# Patient Record
Sex: MALE | Race: WHITE | NOT HISPANIC OR LATINO | Employment: OTHER | URBAN - METROPOLITAN AREA
[De-identification: names, ages, dates, MRNs, and addresses within clinical notes are randomized per-mention and may not be internally consistent; named-entity substitution may affect disease eponyms.]

---

## 2020-11-05 ENCOUNTER — HOSPITAL ENCOUNTER (OUTPATIENT)
Dept: LAB | Facility: MEDICAL CENTER | Age: 65
End: 2020-11-05
Attending: FAMILY MEDICINE
Payer: MEDICARE

## 2020-11-05 PROCEDURE — U0003 INFECTIOUS AGENT DETECTION BY NUCLEIC ACID (DNA OR RNA); SEVERE ACUTE RESPIRATORY SYNDROME CORONAVIRUS 2 (SARS-COV-2) (CORONAVIRUS DISEASE [COVID-19]), AMPLIFIED PROBE TECHNIQUE, MAKING USE OF HIGH THROUGHPUT TECHNOLOGIES AS DESCRIBED BY CMS-2020-01-R: HCPCS

## 2020-11-05 PROCEDURE — C9803 HOPD COVID-19 SPEC COLLECT: HCPCS

## 2020-11-06 LAB
COVID ORDER STATUS COVID19: NORMAL
SARS-COV-2 RNA RESP QL NAA+PROBE: NOTDETECTED
SPECIMEN SOURCE: NORMAL

## 2021-03-03 DIAGNOSIS — Z23 NEED FOR VACCINATION: ICD-10-CM

## 2021-06-14 ENCOUNTER — APPOINTMENT (OUTPATIENT)
Dept: SLEEP MEDICINE | Facility: MEDICAL CENTER | Age: 66
End: 2021-06-14
Payer: MEDICARE

## 2021-06-15 ENCOUNTER — SLEEP CENTER VISIT (OUTPATIENT)
Dept: SLEEP MEDICINE | Facility: MEDICAL CENTER | Age: 66
End: 2021-06-15
Payer: MEDICARE

## 2021-06-15 VITALS
OXYGEN SATURATION: 94 % | WEIGHT: 266 LBS | SYSTOLIC BLOOD PRESSURE: 128 MMHG | BODY MASS INDEX: 38.08 KG/M2 | HEART RATE: 85 BPM | DIASTOLIC BLOOD PRESSURE: 82 MMHG | RESPIRATION RATE: 16 BRPM | HEIGHT: 70 IN

## 2021-06-15 DIAGNOSIS — G47.33 OSA (OBSTRUCTIVE SLEEP APNEA): ICD-10-CM

## 2021-06-15 DIAGNOSIS — G47.10 HYPERSOMNIA: ICD-10-CM

## 2021-06-15 PROCEDURE — 99203 OFFICE O/P NEW LOW 30 MIN: CPT | Performed by: FAMILY MEDICINE

## 2021-06-15 RX ORDER — FUROSEMIDE 20 MG/1
20 TABLET ORAL
COMMUNITY
Start: 2021-05-22 | End: 2022-07-12

## 2021-06-15 RX ORDER — ATORVASTATIN CALCIUM 10 MG/1
10 TABLET, FILM COATED ORAL DAILY
COMMUNITY

## 2021-06-15 RX ORDER — AZATHIOPRINE 50 MG/1
TABLET ORAL
COMMUNITY
Start: 2021-04-19 | End: 2023-05-24

## 2021-06-15 RX ORDER — SOLRIAMFETOL 75 MG/1
75 TABLET, FILM COATED ORAL EVERY MORNING
Qty: 14 TABLET | Refills: 0 | COMMUNITY
Start: 2021-06-15 | End: 2021-06-15

## 2021-06-15 RX ORDER — SOLRIAMFETOL 75 MG/1
75 TABLET, FILM COATED ORAL EVERY MORNING
Qty: 14 TABLET | Refills: 0 | COMMUNITY
Start: 2021-06-15 | End: 2021-06-29

## 2021-06-15 RX ORDER — PREDNISONE 5 MG/1
TABLET ORAL
COMMUNITY
Start: 2021-01-26 | End: 2022-07-12

## 2021-06-15 RX ORDER — TAMSULOSIN HYDROCHLORIDE 0.4 MG/1
0.4 CAPSULE ORAL
Status: ON HOLD | COMMUNITY
End: 2023-07-10

## 2021-06-15 RX ORDER — SULFASALAZINE 500 MG/1
TABLET ORAL
COMMUNITY
Start: 2021-05-16 | End: 2023-05-24

## 2021-06-15 NOTE — PATIENT INSTRUCTIONS
Start off at 37.5 mg (1/2 tab ) x 4 days . If no improvement, increase to 75 mg daily as needed.    Solriamfetol tablets  What is this medicine?  SOLRIAMFETOL (sol ri AM fe don) is used to treat excessive sleepiness caused by certain sleep disorders including narcolepsy and obstructive sleep apnea.  This medicine may be used for other purposes; ask your health care provider or pharmacist if you have questions.  COMMON BRAND NAME(S): NICOLA  What should I tell my health care provider before I take this medicine?  They need to know if you have any of these conditions:  · bipolar disorder  · diabetes  · heart disease  · high blood pressure  · high cholesterol  · history of drug abuse or alcohol abuse problem  · history of stroke  · kidney disease  · schizophrenia  · an unusual or allergic reaction to solriamfetol, other medicines, foods, dyes, or preservatives  · pregnant or trying to get pregnant  · breast-feeding  How should I use this medicine?  Take this medicine by mouth with a glass of water when you first wake up. Do not take it within 9 hours of your planned bedtime. Follow the directions on the prescription label. You can take it with or without food. If it upsets your stomach, take it with food. Take your medicine at regular intervals. Do not take it more often than directed. Do not stop taking except on your doctor's advice.  A special MedGuide will be given to you by the pharmacist with each prescription and refill. Be sure to read this information carefully each time.  Talk to your pediatrician regarding the use of this medicine in children. Special care may be needed.  Overdosage: If you think you have taken too much of this medicine contact a poison control center or emergency room at once.  NOTE: This medicine is only for you. Do not share this medicine with others.  What if I miss a dose?  If you miss a dose, take it as soon as you can. However, avoid taking it within 9 hours of your planned  bedtime, since you may find it harder to go to sleep. If it is almost time for your next dose, take only that dose. Do not take double or extra doses.  What may interact with this medicine?  Do not take this medicine with any of the following medications:  · MAOIs like Carbex, Eldepryl, Marplan, Nardil, and Parnate  This medicine may also interact with the following medications:  · certain medicines for Parkinson's disease like levodopa, pramipexole, or ropinirole  · medicines that increase blood pressure or heart rate  This list may not describe all possible interactions. Give your health care provider a list of all the medicines, herbs, non-prescription drugs, or dietary supplements you use. Also tell them if you smoke, drink alcohol, or use illegal drugs. Some items may interact with your medicine.  What should I watch for while using this medicine?  Visit your healthcare professional for regular checks on your progress. Tell your healthcare professional if your symptoms do not start to get better or if they get worse.  This medicine has a risk of abuse and dependence. Your healthcare provider will check you for this while you take this medicine.  What side effects may I notice from receiving this medicine?  Side effects that you should report to your doctor or health care professional as soon as possible:  · allergic reactions like skin rash, itching, and hives; swelling of the face, lips, or tongue  · anxiety  · changes in emotions or moods  · elevated mood, decreased need for sleep, racing thoughts, impulsive behavior  · fast heartbeat  · hallucinations, loss of contact with reality  · irritable  · signs and symptoms of a dangerous increase in blood pressure like chest pain; shortness of breath; sudden severe headache; vision disturbances; seizures; decreased consciousness  · signs and symptoms of a stroke like changes in vision; confusion; trouble speaking or understanding; severe headaches; sudden numbness  or weakness of the face, arm or leg; trouble walking; dizziness; loss of balance or coordination  · vomiting  Side effects that usually do not require medical attention (report these to your doctor or health care professional if they continue or are bothersome):  · decreased appetite  · dry mouth  · increased sweating  · nausea  · trouble sleeping  This list may not describe all possible side effects. Call your doctor for medical advice about side effects. You may report side effects to FDA at 8-483-UZE-0494.  Where should I keep my medicine?  Keep out of the reach of children. This medicine can be abused. Keep your medicine in a safe place to protect it from theft. Do not share this medicine with anyone. Selling or giving away this medicine is dangerous and is against the law.  Store at room temperature between 15 and 30 degrees C (59 and 86 degrees F).  This medicine may cause harm and death if it is taken by other adults, children, or pets. Return medicine that has not been used to an official disposal site. Contact the [facility] at [phone] or your [facility] government to find a site. If you cannot return the medicine, mix any unused medicine with a substance like cat litter or coffee grounds. Then throw the medicine away in a sealed container like a sealed bag or coffee can with a lid. Do not use the medicine after the expiration date.  NOTE: This sheet is a summary. It may not cover all possible information. If you have questions about this medicine, talk to your doctor, pharmacist, or health care provider.  © 2020 [employer] (2019-10-03 12:40:49)

## 2021-06-15 NOTE — PROGRESS NOTES
"  [facility]  Consult Note     Date: 6/15/2021 / Time: 11:18 AM    Patient ID:   Name:             Gonzalo Mao     YOB: 1955  Age:                 65 y.o.  male   MRN:               [id number]      Thank you for requesting a sleep medicine consultation on Gonzalo Mao at the sleep center. He presents today with the chief complaints of GLENN and to establish as a new pt.  Pt was diagnosed with GLENN about 10 + years ago and has been on ASV EPAP min/max 4/15 cm PS 3/15 cm.The initial presenting symptoms were snoring and excessive daytime sleepiness.    HISTORY OF PRESENT ILLNESS:       At night,  Gonzalo Mao goes to bed around 10 pm on weekdays and on the weekends. He gets out of bed at 7 am on weekdays and  on the weekends.  He  Averages about 9 hrs of sleep on a good night and 7 hrs on a bad night. He falls asleep within 10 minutes. He wakes up about 3 times during the night due to bathroom use and on average It takes him few min to fall back asleep. He is aware of snoring/breathing pauses/gasping or choking in sleep.  He  denies any symptoms of restless legs syndrome such as an \"urge to move\"  He  legs in the evening or bedtime. He  denies any symptoms of narcolepsy such as sleep paralysis or cataplexy, or any symptoms to suggest parasomnias such as sleep walking or acting out of dreams. He  has not used any medications for the sleep problem.Overall, he doesnot finds his sleep refreshing. In terms of  excessive daytime sleepiness,  He complains of sleepiness  while reading or watching TV and while driving. He has dozed off on the traffic light. [place] sleepiness scale score is 24/24.He does not take regular naps. He drinks about 0 caffeinated beverages per day.He has not been using the ASV because the pressure is too much which has been causing mask leak.  The 30-day compliance showed minimal usage with AHI of 17.7/h and increased mask leak.    REVIEW OF SYSTEMS:     "   Constitutional: Denies fevers, Denies weight changes  Eyes: Denies changes in vision, no eye pain  Ears/Nose/Throat/Mouth: Denies nasal congestion or sore throat   Cardiovascular: Denies chest pain or palpitations   Respiratory: Denies shortness of breath , Denies cough  Gastrointestinal/Hepatic: Denies abdominal pain, nausea, vomiting, diarrhea, constipation or GI bleeding   Genitourinary: Denies bladder dysfunction, dysuria or frequency  Musculoskeletal/Rheum: Denies  joint pain and swelling   Skin/Breast: Denies rash  Neurological: Denies headache, confusion, memory loss or focal weakness/parasthesias  Psychiatric: denies mood disorder     Comprehensive review of systems form is reviewed with the patient and is attached in the EMR.     PMH:  has no past medical history on file.  MEDS:   Current Outpatient Medications:   •  atorvastatin (LIPITOR) 10 MG Tab, atorvastatin 10 mg tablet, Disp: , Rfl:   •  azaTHIOprine (IMURAN) 50 MG Tab, TAKE 3 TABLETS BY MOUTH EVERY DAY AS DIRECTED, Disp: , Rfl:   •  furosemide (LASIX) 20 MG Tab, Take 20 mg by mouth every day., Disp: , Rfl:   •  predniSONE (DELTASONE) 5 MG Tab, prednisone 5 mg tablet  TAKE 2 TABLETS BY MOUTH EVERY DAY, Disp: , Rfl:   •  sulfaSALAzine (AZULFIDINE) 500 MG Tab, TAKE 2 TABLETS BY MOUTH TWICE DAILY AS DIRECTED, Disp: , Rfl:   •  tamsulosin (FLOMAX) 0.4 MG capsule, tamsulosin 0.4 mg capsule  Take 1 capsule every day by oral route at bedtime., Disp: , Rfl:   ALLERGIES: No Known Allergies  SURGHX: History reviewed. No pertinent surgical history.  SOCHX:  reports that he has never smoked. He has never used smokeless tobacco. He reports previous alcohol use. He reports previous drug use..    FH:   Family History   Problem Relation Age of Onset   • Sleep Apnea Neg Hx        Physical Exam:  Vitals/ General Appearance:   Weight/BMI: Body mass index is 38.17 kg/m².  /82 (BP Location: Right arm, Patient Position: Sitting, BP Cuff Size: Adult)   Pulse 85    "Resp 16   Ht 1.778 m (5' 10\")   Wt 121 kg (266 lb)   SpO2 94%   Vitals:    06/15/21 1105   BP: 128/82   BP Location: Right arm   Patient Position: Sitting   BP Cuff Size: Adult   Pulse: 85   Resp: 16   SpO2: 94%   Weight: 121 kg (266 lb)   Height: 1.778 m (5' 10\")           Constitutional: Alert, no distress, well-groomed.  Skin: No rashes in visible areas.  Eye: Round. Conjunctiva clear, lids normal. No icterus.   ENMT: Lips pink without lesions, good dentition, moist mucous membranes. Phonation normal.  Neck: No masses, no thyromegaly. Moves freely without pain.  CV: Pulse as reported by patient  Respiratory: Unlabored respiratory effort, no cough or audible wheeze  Psych: Alert and oriented x3, normal affect and mood.   INVESTIGATIONS:        ASSESSMENT AND PLAN     1. Sleep Apnea .     The pathophysiology of sleep anea and the increased risk of cardiovascular morbidity from untreated sleep apnea is discussed in detail with the patient.    He is urged to avoid supine sleep, weight gain and alcoholic beverages since all of these can worsen sleep apnea. He is cautioned against drowsy driving. If He feels sleepy while driving, He must pull over for a break/nap, rather than persist on the road, in the interest of He own safety and that of others on the road.   Plan   -ASV pressure was changed to EPAP min/max 4/10 cm and PS 3/10 cm.  Recommended to continue using AirTouch F 20   -due to residual elevated AHI split-night study is ordered to reestablish the diagnosis as well as to titrate on ASV   - compliance download was reviewed and discussed with the pt   - compliance was reinforced     2.  Hypersomnia: He denies any symptoms of narcolepsy I.e. hypnagogic hallucination, sleep paralysis or cataplexy.  Most likely the cause of his excessive daytime sleepiness is due to untreated sleep apnea.  Due to his sleepiness during driving and his upcoming trip to [place] we will try Sunosi for short period of time while he " is away and re starting the PAP therapy.  Sunosi is just for the interim period.  Low suspicion of narcolepsy therefore we will hold off on doing an MSLT. recommended to start Sunosi at 37.5 mg x 4 days however if no improvement he can increase it to 75 mg.  Side effects were discussed in detail     Regarding treatment of other past medical problems and general health maintenance,  He is urged to follow up with PCP.

## 2021-07-18 ENCOUNTER — SLEEP STUDY (OUTPATIENT)
Dept: SLEEP MEDICINE | Facility: MEDICAL CENTER | Age: 66
End: 2021-07-18
Attending: FAMILY MEDICINE
Payer: MEDICARE

## 2021-07-18 DIAGNOSIS — G47.10 HYPERSOMNIA: ICD-10-CM

## 2021-07-18 DIAGNOSIS — G47.33 OSA (OBSTRUCTIVE SLEEP APNEA): ICD-10-CM

## 2021-07-21 NOTE — PROCEDURES
MONTAGE: Standard     STUDY TYPE: Split night study    RECORDING TECHNIQUE:  After the scalp was prepared, gold plated electrodes were applied to the scalp according to the International 10-20 System. EEG (electroencephalogram) was continuously monitored from the O1-M2, O2-M1, C3-M2, C4-M1, F3-M2, and F4-M1. EOGs (electrooculograms) were monitored by electrodes placed at the left and right outer canthi.  Chin EMG (electromyogram) was monitored by electrodes placed on the mentalis and sub-mentalis muscles.  Nasal and oral airflow were monitored using a triple port thermocouple as well as oronasal pressure transducer.  Respiratory effort was measured by inductive plethysmography technology employing abdominal and thoracic belts.  Blood oxygen saturation and pulse were monitored by pulse oximetry.  Heart rhythm was monitored by surface electrocardiogram.  Leg EMG was studied using surface electrodes placed on left and right anterior tibialis.  A microphone was used to monitor tracheal sounds and snoring.  Body position was monitored and documented by technician observation.     SCORING CRITERIA:     A modification of the AASM manual for scoring of sleep and associated events was used.  Obstructive apneas were scored by cessation of airflow for at least 10 seconds with continuing respiratory effort.  Central apneas  were  scored by cessation of airflow for at least 10 seconds with no respiratory effort.  Hypopneas were scored by a 30% or more reduction in airflow for at least 10 seconds accompanied by arterial oxygen desaturation of 3%  or an arousal.  CMS, for Medicare patients , hypopneas are scored by 30% with mild reduction in airflow for at least 10 seconds accompanied by arterial saturation decreased at 4%.       Interpretation:  Study start time was 10:46:01 PM. Total recording time was 3h 30.0m (210 minutes) with a total sleep time of 2h 18.0m (138 minutes) resulting in a sleep efficiency of 65.71%.  Sleep  latency from the start fo the study was 11 minutes minutes and REM latency from sleep onset was 00 minutes minutes.    Respiratory:   There were 222 apneas in total consisting of 196 obstructive apneas, 0 mixed apneas, and 26 central apneas. There were 3 hypopneas in total.  The apnea index was 96.52 per hour and the hypopnea index was 1.30 per hour.The overall AHI was 97.8, with a REM AHI of 0.00, and a supine AHI of 122.50.  11 % of the events were central in nature.    Limb Movements:  There were a total of 43 periodic leg movements, of which 13 were PLMS arousals. This resulted in a PLMS index of 18.7 and a PLMS arousal index of 5.7    Oximetry:  The mean SaO2 was 88.0% for the diagnostic portion of the study, with a minimum SaO2 of 67.0%.   Treatment:  Interpretation:  Treatment recording time was 4h 16.5m (256 minutes) with a total sleep time of 3h 22.0m (202 minutes) resulting in a sleep efficiency of 78.8%. Sleep latency from the start of treatment was 01 minutes minutes and REM latency from sleep onset was 0h 43.0m minutes. The patient had 227 arousals in total for an arousal index of 67.4.    Respiratory:   There were 174 apneas in total consisting of 107 obstructive apneas, 67 central apneas, and 0 mixed apneas for an apnea index of 51.68.   The patient had 67 hypopneas in total, which resulted in a hypopnea index of 19.90. The overall AHI was 71.58, with a REM AHI of 42.58, and a supine AHI of 71.58.   27% of the events were central in nature.    Limb Movements:  There were a total of 115 periodic leg movements, of which 60 were PLMS arousals. This resulted in a PLMS index of 34.2 and a PLMS arousal index of 17.8.    Oximetry:  The mean SaO2 during treatment was 89.0%, with a minimum oxygen saturation of 68.0%.    CPAP was tried from 5 to 14 cm H2O. BiPAP was tried from 13/9 to 22/18cm H2O.ASV was tried from EPAP 6 PS: 10/3 to  EPAP: 10/ 4, PS: 10/3cm H2O        CPAP Titration:  Due to the significant  number of obstructive respiratory events observed during the diagnostic portion of the study a CPAP titration trial was performed during the second half of the night. The CPAP pressure was initiated at 5 cm of water and the pressure was increased in an attempt to eliminate all sleep disordered breathing and snoring. CPAP was increased to 14  Cm before switching to BiPAP. The BiPAP was titrated between 13/9 cm to 22/18 cm. At the end of the night, ResMed ASV therapy was tried because patient is currently utilizing ASVAuto @ EPAP 4/10, PS 3/10 at home. ASV titration was started at EPAP 6, PS 3/10 and then tried the ASVAuto at EPAP 4/10, PS 3/10.  It was a difficult titration due to mask leak. Chin strap was tried however patient was not keen on using it. At this final pressure the patient was observed in the supine but not REM sleep stage. The apnea hypopnea index was 67.32/hr with improved O2 liz of  80%.He spent 36 % of sleep time below 88% O2 saturation. Snoring persisted through out the study.  The patient utilized large AirTouch F20  mask with heated humidification.     Impression:  1.  Severe obstructive sleep apnea with AHI of 97.8/hr and O2 liz 67 %. Due to severity of the disease he met the split study protocol. The titration started with CPAP 5 cm and the ending pressure was ResMed ASVAuto at EPAP 4/10, PS 3/10 cm. The AHI improved to 67.32/hr with improved O2 liz of 80% and average O2 saturation of 90 %.     2.  Sleep related hypoxia    Recommendations:  No definitive pressure can be extrapolated from the titration, I recommend dedicated ASV titration. In some cases alternative treatment options may be proven effective in resolving sleep apnea. These options include upper airway surgery, the use of a dental orthotic, weight loss orpositional therapy. Clinical correlation is required. In general patients with sleep apnea are advised to avoid alcohol, sedatives and not to operate a motor vehicle while  drowsy.  Untreated sleep apnea increases the risk for cardiovascular and neurovascular disease.

## 2021-07-25 PROCEDURE — 95811 POLYSOM 6/>YRS CPAP 4/> PARM: CPT | Performed by: FAMILY MEDICINE

## 2021-07-29 ENCOUNTER — SLEEP CENTER VISIT (OUTPATIENT)
Dept: SLEEP MEDICINE | Facility: MEDICAL CENTER | Age: 66
End: 2021-07-29
Payer: MEDICARE

## 2021-07-29 VITALS
SYSTOLIC BLOOD PRESSURE: 128 MMHG | DIASTOLIC BLOOD PRESSURE: 84 MMHG | OXYGEN SATURATION: 93 % | HEIGHT: 69 IN | RESPIRATION RATE: 16 BRPM | WEIGHT: 261 LBS | HEART RATE: 86 BPM | BODY MASS INDEX: 38.66 KG/M2

## 2021-07-29 DIAGNOSIS — G47.31 COMPLEX SLEEP APNEA SYNDROME: ICD-10-CM

## 2021-07-29 PROCEDURE — 99213 OFFICE O/P EST LOW 20 MIN: CPT | Performed by: FAMILY MEDICINE

## 2021-07-29 RX ORDER — ZOLPIDEM TARTRATE 5 MG/1
5 TABLET ORAL NIGHTLY PRN
Qty: 2 TABLET | Refills: 0 | Status: SHIPPED | OUTPATIENT
Start: 2021-07-29 | End: 2021-07-30

## 2021-07-29 NOTE — PATIENT INSTRUCTIONS
Zolpidem tablets  What is this medicine?  ZOLPIDEM (zole PI dem) is used to treat insomnia. This medicine helps you to fall asleep and sleep through the night.  This medicine may be used for other purposes; ask your health care provider or pharmacist if you have questions.  COMMON BRAND NAME(S): Lyle  What should I tell my health care provider before I take this medicine?  They need to know if you have any of these conditions:  · depression  · history of drug abuse or addiction  · if you often drink alcohol  · liver disease  · lung or breathing disease  · myasthenia gravis  · sleep apnea  · sleep-walking, driving, eating or other activity while not fully awake after taking a sleep medicine  · suicidal thoughts, plans, or attempt; a previous suicide attempt by you or a family member  · an unusual or allergic reaction to zolpidem, other medicines, foods, dyes, or preservatives  · pregnant or trying to get pregnant  · breast-feeding  How should I use this medicine?  Take this medicine by mouth with a glass of water. Follow the directions on the prescription label. It is better to take this medicine on an empty stomach and only when you are ready for bed. Do not take your medicine more often than directed. If you have been taking this medicine for several weeks and suddenly stop taking it, you may get unpleasant withdrawal symptoms. Your doctor or health care professional may want to gradually reduce the dose. Do not stop taking this medicine on your own. Always follow your doctor or health care professional's advice.  A special MedGuide will be given to you by the pharmacist with each prescription and refill. Be sure to read this information carefully each time.  Talk to your pediatrician regarding the use of this medicine in children. Special care may be needed.  Overdosage: If you think you have taken too much of this medicine contact a poison control center or emergency room at once.  NOTE: This medicine is only  "for you. Do not share this medicine with others.  What if I miss a dose?  This does not apply. This medicine should only be taken immediately before going to sleep. Do not take double or extra doses.  What may interact with this medicine?  · alcohol  · antihistamines for allergy, cough and cold  · certain medicines for anxiety or sleep  · certain medicines for depression, like amitriptyline, fluoxetine, sertraline  · certain medicines for fungal infections like ketoconazole and itraconazole  · certain medicines for seizures like phenobarbital, primidone  · ciprofloxacin  · dietary supplements for sleep, like valerian or kava kava  · general anesthetics like halothane, isoflurane, methoxyflurane, propofol  · local anesthetics like lidocaine, pramoxine, tetracaine  · medicines that relax muscles for surgery  · narcotic medicines for pain  · phenothiazines like chlorpromazine, mesoridazine, prochlorperazine, thioridazine  · rifampin  This list may not describe all possible interactions. Give your health care provider a list of all the medicines, herbs, non-prescription drugs, or dietary supplements you use. Also tell them if you smoke, drink alcohol, or use illegal drugs. Some items may interact with your medicine.  What should I watch for while using this medicine?  Visit your doctor or health care professional for regular checks on your progress. Keep a regular sleep schedule by going to bed at about the same time each night. Avoid caffeine-containing drinks in the evening hours. When sleep medicines are used every night for more than a few weeks, they may stop working. Talk to your doctor if you still have trouble sleeping.  After taking this medicine, you may get up out of bed and do an activity that you do not know you are doing. The next morning, you may have no memory of this. Activities include driving a car (\"sleep-driving\"), making and eating food, talking on the phone, sexual activity, and sleep-walking. " Serious injuries have occurred. Stop the medicine and call your doctor right away if you find out you have done any of these activities. Do not take this medicine if you have used alcohol that evening. Do not take it if you have taken another medicine for sleep. The risk of doing these sleep-related activities is higher.  Wait for at least 8 hours after you take a dose before driving or doing other activities that require full mental alertness. Do not take this medicine unless you are able to stay in bed for a full night (7 to 8 hours) before you must be active again. You may have a decrease in mental alertness the day after use, even if you feel that you are fully awake. Tell your doctor if you will need to perform activities requiring full alertness, such as driving, the next day. Do not stand or sit up quickly after taking this medicine, especially if you are an older patient. This reduces the risk of dizzy or fainting spells.  If you or your family notice any changes in your behavior, such as new or worsening depression, thoughts of harming yourself, anxiety, other unusual or disturbing thoughts, or memory loss, call your doctor right away.  After you stop taking this medicine, you may have trouble falling asleep. This is called rebound insomnia. This problem usually goes away on its own after 1 or 2 nights.  What side effects may I notice from receiving this medicine?  Side effects that you should report to your doctor or health care professional as soon as possible:  · allergic reactions like skin rash, itching or hives, swelling of the face, lips, or tongue  · breathing problems  · changes in vision  · confusion  · depressed mood or other changes in moods or emotions  · feeling faint or lightheaded, falls  · hallucinations  · loss of balance or coordination  · loss of memory  · numbness or tingling of the tongue  · restlessness, excitability, or feelings of anxiety or agitation  · signs and symptoms of liver  injury like dark yellow or brown urine; general ill feeling or flu-like symptoms; light-colored stools; loss of appetite; nausea; right upper belly pain; unusually weak or tired; yellowing of the eyes or skin  · suicidal thoughts  · unusual activities while not fully awake like driving, eating, making phone calls, or sexual activity  Side effects that usually do not require medical attention (report to your doctor or health care professional if they continue or are bothersome):  · dizziness  · drowsiness the day after you take this medicine  · headache  This list may not describe all possible side effects. Call your doctor for medical advice about side effects. You may report side effects to FDA at 4-892-MHE-2295.  Where should I keep my medicine?  Keep out of the reach of children. This medicine can be abused. Keep your medicine in a safe place to protect it from theft. Do not share this medicine with anyone. Selling or giving away this medicine is dangerous and against the law.  This medicine may cause accidental overdose and death if taken by other adults, children, or pets. Mix any unused medicine with a substance like cat litter or coffee grounds. Then throw the medicine away in a sealed container like a sealed bag or a coffee can with a lid. Do not use the medicine after the expiration date.  Store at room temperature between 20 and 25 degrees C (68 and 77 degrees F).  NOTE: This sheet is a summary. It may not cover all possible information. If you have questions about this medicine, talk to your doctor, pharmacist, or health care provider.  © 2020 [employer] (2019-09-06 11:51:08)

## 2021-07-29 NOTE — PROGRESS NOTES
[facility] Follow Up Note     Date: 7/29/2021 / Time: 8:17 AM    Patient ID:   Name:             Gonzalo Mao     YOB: 1955  Age:                 65 y.o.  male   MRN:               [id number]      Thank you for requesting a sleep medicine consultation on Gonzalo Mao at the sleep center. He presents today with the chief complaints of SS follow up. Pt was diagnosed with GLENN about 10 + years ago.  He was accompanied by his wife  HISTORY OF PRESENT ILLNESS:       Pt is currently on ASV EPAP min/max 4/10 cm and PS 3/10 cm.  Since his last visit he has been trying to use the ASV machine more consistently and has felt overall improvement in quality of sleep as well as libido. He denies any symptoms of RLS, narcolepsy or any symptoms to suggest parasomnias such as nightmares, sleep walking or acting out of dreams.      He is using ASV most days of the week. Pt reports 3 hrs of average nightly use of ASV. Pt denies snoring, gasping,choking.Pt also denies significant mask leak that is interfering with sleep. The 30 day compliance was downloaded which shows inadequate compliance with more that 4 hr usage about 40%. The AHI is has improved to 18.7/hr. The mask leak is abnormal. The symptoms of excessive daytime, snoring and gasping has improved.     Since his last visit he had a split night study on 7/18/221 which showed Severe obstructive sleep apnea with AHI of 97.8/hr and O2 liz 67 %. Due to severity of the disease he met the split study protocol. The titration started with CPAP 5 cm and the ending pressure was ResMed ASVAuto at EPAP 4/10, PS 3/10 cm. The AHI improved to 67.32/hr with improved O2 liz of 80% and average O2 saturation of 90 %. He spent 36 % of sleep time below 88% O2 saturation      REVIEW OF SYSTEMS:       Constitutional: Denies fevers, Denies weight changes  Eyes: Denies changes in vision, no eye pain  Ears/Nose/Throat/Mouth: Denies nasal congestion or sore throat    Cardiovascular: Denies chest pain or palpitations   Respiratory: Denies shortness of breath , Denies cough  Gastrointestinal/Hepatic: Denies abdominal pain, nausea, vomiting, diarrhea, constipation or GI bleeding   Genitourinary: Deniesdysuria or frequency  Musculoskeletal/Rheum: Denies  joint pain and swelling   Skin/Breast: Denies rash,   Neurological: Denies headache, confusion, memory loss or focal weakness/parasthesias  Psychiatric: denies mood disorder   Sleep: denies snoring on the ASV    Comprehensive review of systems form is reviewed with the patient and is attached in the EMR.     PMH:  has no past medical history on file.  MEDS:   Current Outpatient Medications:   •  atorvastatin (LIPITOR) 10 MG Tab, atorvastatin 10 mg tablet, Disp: , Rfl:   •  azaTHIOprine (IMURAN) 50 MG Tab, TAKE 3 TABLETS BY MOUTH EVERY DAY AS DIRECTED, Disp: , Rfl:   •  furosemide (LASIX) 20 MG Tab, Take 20 mg by mouth every day., Disp: , Rfl:   •  predniSONE (DELTASONE) 5 MG Tab, prednisone 5 mg tablet  TAKE 2 TABLETS BY MOUTH EVERY DAY, Disp: , Rfl:   •  sulfaSALAzine (AZULFIDINE) 500 MG Tab, TAKE 2 TABLETS BY MOUTH TWICE DAILY AS DIRECTED, Disp: , Rfl:   •  HYDROcodone-acetaminophen (NORCO) 5-325 MG Tab per tablet, Take 1 tablet by mouth 2 times a day for 21 days., Disp: 42 tablet, Rfl: 0  •  tamsulosin (FLOMAX) 0.4 MG capsule, tamsulosin 0.4 mg capsule  Take 1 capsule every day by oral route at bedtime. (Patient not taking: Reported on 7/29/2021), Disp: , Rfl:   ALLERGIES: No Known Allergies  SURGHX: No past surgical history on file.  SOCHX:  reports that he has never smoked. He has never used smokeless tobacco. He reports previous alcohol use. He reports previous drug use..  FH:   Family History   Problem Relation Age of Onset   • Sleep Apnea Neg Hx          Physical Exam:  Vitals/ General Appearance:   Weight/BMI: Body mass index is 39.43 kg/m².  /84 (BP Location: Right arm, Patient Position: Sitting, BP Cuff Size:  "Adult)   Pulse 86   Resp 16   Ht 1.753 m (5' 9\")   Wt 121 kg (267 lb)   SpO2 93%   Vitals:    07/29/21 0812   BP: 128/84   BP Location: Right arm   Patient Position: Sitting   BP Cuff Size: Adult   Pulse: 86   Resp: 16   SpO2: 93%   Weight: 121 kg (267 lb)   Height: 1.753 m (5' 9\")       Pt. is alert and oriented to time, place and person. Cooperative and in no apparent distress.       Constitutional: Alert, no distress, well-groomed.  Skin: No rashes in visible areas.  Eye: Round. Conjunctiva clear, lids normal. No icterus.   ENMT: Lips pink without lesions, good dentition, moist mucous membranes. Phonation normal.  Neck: No masses, no thyromegaly. Moves freely without pain.  CV: Pulse as reported by patient  Respiratory: Unlabored respiratory effort, no cough or audible wheeze  Psych: Alert and oriented x3, normal affect and mood.     ASSESSMENT AND PLAN     1. Sleep Apnea The pathophysiology of sleep anea and the increased risk of cardiovascular morbidity from untreated sleep apnea is discussed in detail with the patient.    He is urged to avoid supine sleep, weight gain and alcoholic beverages since all of these can worsen sleep apnea. He is cautioned against drowsy driving. If He feels sleepy while driving, He must pull over for a break/nap, rather than persist on the road, in the interest of He own safety and that of others on the road.   Plan   - Continue EPAP min/max 4/10 cm and PS 3/10 cm. The AHI is likely elevated due to mask leak as well .   Therefore vitera fullface mask is ordered today   -  After informed discussion BiPAP/ASV titration is ordered today.  We will order the new machine after the  sleep study and follow-up as a first compliance   - compliance download was reviewed and discussed with the pt   - compliance was reinforced    -Weight loss was discussed in detail  2. Regarding treatment of other past medical problems and general health maintenance,  He is urged to follow up with PCP.    "

## 2021-08-02 ENCOUNTER — APPOINTMENT (OUTPATIENT)
Dept: SLEEP MEDICINE | Facility: MEDICAL CENTER | Age: 66
End: 2021-08-02
Attending: FAMILY MEDICINE
Payer: MEDICARE

## 2021-08-17 ENCOUNTER — APPOINTMENT (OUTPATIENT)
Dept: SLEEP MEDICINE | Facility: MEDICAL CENTER | Age: 66
End: 2021-08-17
Payer: MEDICARE

## 2021-09-22 ENCOUNTER — APPOINTMENT (OUTPATIENT)
Dept: SLEEP MEDICINE | Facility: MEDICAL CENTER | Age: 66
End: 2021-09-22
Payer: MEDICARE

## 2021-10-05 ENCOUNTER — SLEEP STUDY (OUTPATIENT)
Dept: SLEEP MEDICINE | Facility: MEDICAL CENTER | Age: 66
End: 2021-10-05
Payer: MEDICARE

## 2021-10-05 DIAGNOSIS — G47.31 COMPLEX SLEEP APNEA SYNDROME: ICD-10-CM

## 2021-10-05 PROCEDURE — 95811 POLYSOM 6/>YRS CPAP 4/> PARM: CPT | Performed by: FAMILY MEDICINE

## 2021-10-08 NOTE — PROCEDURES
MONTAGE: Standard    STUDY TYPE: Diagnostic  RECORDING TECHNIQUE:   After the scalp was prepared, gold plated electrodes were applied to the scalp according to the International 10-20 System. EEG (electroencephalogram) was continuously monitored from the O1-M2, O2-M1, C3-M2, C4-M1, F3-M2, and F4-M1. EOGs (electrooculograms) were monitored by electrodes placed at the left and right outer canthi. Chin EMG (electromyogram) was monitored by electrodes placed on the mentalis and sub-mentalis muscles. Nasal and oral airflow were monitored using a triple port thermocouple as well as oronasal pressure transducer. Respiratory effort was measured by inductive plethysmography technology employing abdominal and thoracic belts. Blood oxygen saturation and pulse were monitored by pulse oximetry. Heart rhythm was monitored by surface electrocardiogram. Leg EMG was studied using surface electrodes placed on left and right anterior tibialis. A microphone was used to monitor tracheal sounds and snoring. Body position was monitored and documented by technician observation.     SCORING CRITERIA:   A modification of the AASM manual for scoring of sleep and associated events was used. Obstructive apneas were scored by cessation of airflow for at least 10 seconds with continuing respiratory effort. Central apneas were scored by cessation of airflow for at least 10 seconds with no respiratory effort. Hypopneas were scored by a 30% or more reduction in airflow for at least 10 seconds accompanied by arterial oxygen desaturation of 3% or an arousal. For CMS (Medicare) patients, per AASM rule 1B, hypopneas are scored by 30% with mild reduction in airflow for at least 10 seconds accompanied by arterial saturation decreased at 4%.    Study start time was 09:33:41 PM. Diagnostic recording time was 8h 19.0m with a total sleep time of 7h 32.0m resulting in a sleep efficiency of 90.58%%. Sleep latency from the start of the study was 03 minutes and  the latency from sleep to REM was 119 minutes. In total,159 arousals were scored for an arousal index of 21.1.    Respiratory:  There were a total of 79 apneas consisting of 45 obstructive apneas, 0 mixed apneas, and 34 central apneas. A total of 197 hypopneas were scored. The apnea index was 10.49 per hour and the hypopnea index was 26.15 per hour resulting in an overall AHI of 36.64. AHI during rem was 9.6 and AHI while supine was 36.64.    Oximetry:  There was a mean oxygen saturation of 92.0% with a minimum oxygen saturation of 70.0%. The patient spent 10.6 minutes of TST with SaO2 <88%.    Cardiac:  The highest heart rate seen while awake was 113 BPM while the highest heart rate during sleep was 91 BPM with an average sleeping heart rate of 74 BPM.    Limb Movements:  There were a total of 256 PLMs during sleep, of which 49 were PLMS arousals. This resulted in a PLMS index of 34.0 and a PLMS arousal index of 6.5.    Titration: BiPAP was tried from 18/12 to 23/18cm H2O. ASV was tried from EPAP: 10, PS: 15/3 to EPAP: 15, PS: 10/3 cm H2O.     CPAP Titration:  The PAP titration was initiated with BiPAP 18/12 cm of water and the pressure which was slowly titrated up in an attempt to eliminate sleep disordered breathing and snoring.  BiPAP was increased to 23/1  Cm before switching to ASV. ASV was tried from EPAP: 10, PS: 15/3 to EPAP: 15, PS: 10/3 cm H2O.he had elevated AHI on all the tested pressures.  At this final pressure the patient was observed in the supine REM sleep stage. The apnea hypopnea index improved to 15.4  He spent 16 % of sleep time below 88% O2 saturation. The patient utilized large vitera mask with heated humidification. The PAP was well-tolerated and there were minimal air leaks.     Impression:  1.  Complex sleep apnea   2. Sleep related hypoxia    Recommendations:  No definitive pressure can be extrapolated from the titration, however ResMed ASV EPAP: 15, PS: 10/3 cm H2O can be tried. Consider  supplemental O2 bleed in and f/u with OPO on the recommended pressure due to residual sleep hypoxia. I recommended 30 day compliance download to assess the efficacy of the recommended pressure and compliance for further outpatient monitoring and management of CPAP therapy. In some cases alternative treatment options may be proven effective in resolving sleep apnea. These options include upper airway surgery, the use of a dental orthotic, weight loss orpositional therapy. Clinical correlation is required. In general patients with sleep apnea are advised to avoid alcohol, sedatives and not to operate a motor vehicle while drowsy.  Untreated sleep apnea increases the risk for cardiovascular and neurovascular disease.

## 2021-11-17 ENCOUNTER — OFFICE VISIT (OUTPATIENT)
Dept: SLEEP MEDICINE | Facility: MEDICAL CENTER | Age: 66
End: 2021-11-17
Payer: MEDICARE

## 2021-11-17 VITALS
DIASTOLIC BLOOD PRESSURE: 82 MMHG | SYSTOLIC BLOOD PRESSURE: 124 MMHG | WEIGHT: 275 LBS | OXYGEN SATURATION: 93 % | RESPIRATION RATE: 16 BRPM | BODY MASS INDEX: 40.73 KG/M2 | HEART RATE: 92 BPM | HEIGHT: 69 IN

## 2021-11-17 DIAGNOSIS — G47.10 HYPERSOMNIA: ICD-10-CM

## 2021-11-17 DIAGNOSIS — G47.33 OSA (OBSTRUCTIVE SLEEP APNEA): ICD-10-CM

## 2021-11-17 PROCEDURE — 99214 OFFICE O/P EST MOD 30 MIN: CPT | Performed by: FAMILY MEDICINE

## 2021-11-17 RX ORDER — SOLRIAMFETOL 75 MG/1
75 TABLET, FILM COATED ORAL
Qty: 7 TABLET | Refills: 0 | COMMUNITY
Start: 2021-11-17 | End: 2021-11-24

## 2021-11-17 NOTE — PROGRESS NOTES
[facility] Follow Up Note     Date: 11/17/2021 / Time: 1:14 PM    Patient ID:   Name:             Gonzalo Mao     YOB: 1955  Age:                 66 y.o.  male   MRN:               [id number]      Thank you for requesting a sleep medicine consultation on Gonzalo Mao at the sleep center. He presents today with the chief complaints of complex sleep apnea follow up.     HISTORY OF PRESENT ILLNESS:       Pt is currently on ResMed ASV EPAP 4/10 pressure support 3/10 cm. He goes to sleep around 10 pm and wakes up around 6:60 am. He is getting about 7-8 hrs of sleep on a good night. Overall, he  doesnot finds his sleep refreshing. He continues have drowsiness while driving.He denies any symptoms of RLS, narcolepsy or any symptoms to suggest parasomnias such as nightmares, sleep walking or acting out of dreams.      He is not using ASV most days of the week. Pt reports 4 hrs of average nightly use of ASV. Pt denies snoring, gasping,choking.Pt also denies significant mask leak that is interfering with sleep. The 30 day compliance was downloaded which shows inadequate compliance with more that 4 hr usage about 10%. The AHI is has improved to 8.2/hr. The mask leak is abnormal.  He is unable to use the ASV due to non working humdifier and general PAP intolerance. the symptoms of snoring and gasping has continued w/o the therapy. He continues to have extreme daytime sleepiness. He has an upcoming long road trip and he is concerned about drowsy driving.  [place] today is 9 out of 24.    Since last visit he had a Pap titration.The PAP titration was initiated with BiPAP 18/12 cm of water and the pressure which was slowly titrated up in an attempt to eliminate sleep disordered breathing and snoring.  BiPAP was increased to 23/1  Cm before switching to ASV. ASV was tried from EPAP: 10, PS: 15/3 to EPAP: 15, PS: 10/3 cm H2O.he had elevated AHI on all the tested pressures.  At this final pressure  the patient was observed in the supine REM sleep stage. The apnea hypopnea index improved to 15.4  He spent 16 % of sleep time below 88% O2 saturation.       SLEEP HISTORY   split night study on 7/18/221 which showed Severe obstructive sleep apnea with AHI of 97.8/hr and O2 liz 67 %. Due to severity of the disease he met the split study protocol. The titration started with CPAP 5 cm and the ending pressure was ResMed ASVAuto at EPAP 4/10, PS 3/10 cm. The AHI improved to 67.32/hr with improved O2 liz of 80% and average O2 saturation of 90 %. He spent 36 % of sleep time below 88% O2 saturation      REVIEW OF SYSTEMS:       Constitutional: Denies fevers, Denies weight changes  Eyes: Denies changes in vision, no eye pain  Ears/Nose/Throat/Mouth: Denies nasal congestion or sore throat   Cardiovascular: Denies chest pain or palpitations   Respiratory: Denies shortness of breath , Denies cough  Gastrointestinal/Hepatic: Denies abdominal pain, nausea, vomiting, diarrhea, constipation or GI bleeding   Genitourinary: Deniesdysuria or frequency  Musculoskeletal/Rheum: Denies  joint pain and swelling   Skin/Breast: Denies rash,   Neurological: Denies headache, confusion, memory loss or focal weakness/parasthesias  Psychiatric: denies mood disorder   Sleep: + EDS    Comprehensive review of systems form is reviewed with the patient and is attached in the EMR.     PMH:  has no past medical history on file.  MEDS:   Current Outpatient Medications:   •  HYDROcodone-acetaminophen (NORCO) 5-325 MG Tab per tablet, Take 1 Tablet by mouth 2 times a day for 21 days., Disp: 42 Tablet, Rfl: 0  •  atorvastatin (LIPITOR) 10 MG Tab, atorvastatin 10 mg tablet, Disp: , Rfl:   •  azaTHIOprine (IMURAN) 50 MG Tab, TAKE 3 TABLETS BY MOUTH EVERY DAY AS DIRECTED, Disp: , Rfl:   •  furosemide (LASIX) 20 MG Tab, Take 20 mg by mouth every day., Disp: , Rfl:   •  predniSONE (DELTASONE) 5 MG Tab, prednisone 5 mg tablet  TAKE 2 TABLETS BY MOUTH EVERY  "DAY, Disp: , Rfl:   •  sulfaSALAzine (AZULFIDINE) 500 MG Tab, TAKE 2 TABLETS BY MOUTH TWICE DAILY AS DIRECTED, Disp: , Rfl:   •  tamsulosin (FLOMAX) 0.4 MG capsule, tamsulosin 0.4 mg capsule  Take 1 capsule every day by oral route at bedtime. (Patient not taking: Reported on 7/29/2021), Disp: , Rfl:   ALLERGIES: No Known Allergies  SURGHX: No past surgical history on file.  SOCHX:  reports that he has never smoked. He has never used smokeless tobacco. He reports previous alcohol use. He reports previous drug use..  FH:   Family History   Problem Relation Age of Onset   • Sleep Apnea Neg Hx          Physical Exam:  Vitals/ General Appearance:   Weight/BMI: Body mass index is 40.61 kg/m².  /82 (BP Location: Left arm, Patient Position: Sitting, BP Cuff Size: Adult)   Pulse 92   Resp 16   Ht 1.753 m (5' 9\")   Wt 125 kg (275 lb)   SpO2 93%   Vitals:    11/17/21 1300   BP: 124/82   BP Location: Left arm   Patient Position: Sitting   BP Cuff Size: Adult   Pulse: 92   Resp: 16   SpO2: 93%   Weight: 125 kg (275 lb)   Height: 1.753 m (5' 9\")       Pt. is alert and oriented to time, place and person. Cooperative and in no apparent distress.       Constitutional: Alert, no distress, well-groomed.  Skin: No rashes in visible areas.  Eye: Round. Conjunctiva clear, lids normal. No icterus.   ENMT: Lips pink without lesions, good dentition, moist mucous membranes. Phonation normal.  Neck: No masses, no thyromegaly. Moves freely without pain.  CV: Pulse as reported by patient  Respiratory: Unlabored respiratory effort, no cough or audible wheeze  Psych: Alert and oriented x3, normal affect and mood.     ASSESSMENT AND PLAN     1.Complex Sleep Apnea    The pathophysiology of sleep anea and the increased risk of cardiovascular morbidity from untreated sleep apnea is discussed in detail with the patient.    He is urged to avoid supine sleep, weight gain and alcoholic beverages since all of these can worsen sleep apnea. He is " cautioned against drowsy driving. If He feels sleepy while driving, He must pull over for a break/nap, rather than persist on the road, in the interest of He own safety and that of others on the road.   Plan   - Continue ASV EPAP 4/10 cm PS 3/10 cm   - New ASV EPAP 4/10 cm PS 3/10 cm is ordered today    - compliance download was reviewed and discussed with the pt   - compliance was reinforced    -His excessive daytime sleepiness is due to untreated sleep apnea. He has an upcoming long road trip and he is concerned about drowsy driving.  Therefore sample for [place] was given however this will be the only time.  If he continues to be noncompliant with therapy we will not provide any prescription for stimulants.  2. Regarding treatment of other past medical problems and general health maintenance,  He is urged to follow up with PCP.

## 2022-02-04 ENCOUNTER — PATIENT MESSAGE (OUTPATIENT)
Dept: SLEEP MEDICINE | Facility: MEDICAL CENTER | Age: 67
End: 2022-02-04
Payer: MEDICARE

## 2022-02-23 ENCOUNTER — APPOINTMENT (OUTPATIENT)
Dept: SLEEP MEDICINE | Facility: MEDICAL CENTER | Age: 67
End: 2022-02-23
Payer: MEDICARE

## 2022-03-07 ENCOUNTER — TELEPHONE (OUTPATIENT)
Dept: SLEEP MEDICINE | Facility: MEDICAL CENTER | Age: 67
End: 2022-03-07
Payer: MEDICARE

## 2022-03-07 NOTE — PATIENT COMMUNICATION
The pt current insurance is not in network with us and because he has insurance he can not pay out of pocket for this insurance. The pt is change insurance after 4/1/2022. Is it okay for me to move apt the pt apt to later date?

## 2022-03-07 NOTE — TELEPHONE ENCOUNTER
Per Victoria the pt has a new  Medicare Advantage plan. You can ask the patient if it's through [employer] and which employer, but if it's not, that appt needs to be cancelled and maybe dr. العلي can refer them somewhere in-network?    LVM for th ept asking him if his insurance is through [employer] or prior employer i advise him to give me a call back or [employer] message prior to his apt. The pt called back and stated that he is paying for his insurance. I informed him that  unfortunately we do not take his insurance and he could not pay out of pocket for this apt because he has insurance. After informing the pt of this he stated to me that he would like to keep this apt and he is going to change his insurance.

## 2022-03-08 ENCOUNTER — APPOINTMENT (OUTPATIENT)
Dept: SLEEP MEDICINE | Facility: MEDICAL CENTER | Age: 67
End: 2022-03-08
Payer: MEDICARE

## 2022-03-10 ENCOUNTER — PATIENT MESSAGE (OUTPATIENT)
Dept: SLEEP MEDICINE | Facility: MEDICAL CENTER | Age: 67
End: 2022-03-10
Payer: MEDICARE

## 2022-03-10 DIAGNOSIS — G47.33 OSA (OBSTRUCTIVE SLEEP APNEA): ICD-10-CM

## 2022-05-05 ENCOUNTER — APPOINTMENT (OUTPATIENT)
Dept: SLEEP MEDICINE | Facility: MEDICAL CENTER | Age: 67
End: 2022-05-05
Payer: MEDICARE

## 2022-07-12 ENCOUNTER — PRE-ADMISSION TESTING (OUTPATIENT)
Dept: ADMISSIONS | Facility: MEDICAL CENTER | Age: 67
End: 2022-07-12
Attending: OTOLARYNGOLOGY
Payer: MEDICARE

## 2022-07-12 DIAGNOSIS — Z01.810 PRE-OPERATIVE CARDIOVASCULAR EXAMINATION: ICD-10-CM

## 2022-07-12 LAB — EKG IMPRESSION: NORMAL

## 2022-07-12 PROCEDURE — 93005 ELECTROCARDIOGRAM TRACING: CPT

## 2022-07-12 PROCEDURE — 93010 ELECTROCARDIOGRAM REPORT: CPT | Performed by: INTERNAL MEDICINE

## 2022-07-18 ENCOUNTER — HOSPITAL ENCOUNTER (OUTPATIENT)
Facility: MEDICAL CENTER | Age: 67
End: 2022-07-18
Attending: OTOLARYNGOLOGY | Admitting: OTOLARYNGOLOGY
Payer: MEDICARE

## 2022-07-18 ENCOUNTER — ANESTHESIA (OUTPATIENT)
Dept: SURGERY | Facility: MEDICAL CENTER | Age: 67
End: 2022-07-18
Payer: MEDICARE

## 2022-07-18 ENCOUNTER — ANESTHESIA EVENT (OUTPATIENT)
Dept: SURGERY | Facility: MEDICAL CENTER | Age: 67
End: 2022-07-18
Payer: MEDICARE

## 2022-07-18 VITALS
SYSTOLIC BLOOD PRESSURE: 147 MMHG | HEIGHT: 70 IN | TEMPERATURE: 97.7 F | WEIGHT: 243.61 LBS | DIASTOLIC BLOOD PRESSURE: 63 MMHG | RESPIRATION RATE: 15 BRPM | OXYGEN SATURATION: 95 % | BODY MASS INDEX: 34.88 KG/M2 | HEART RATE: 64 BPM

## 2022-07-18 PROCEDURE — 700111 HCHG RX REV CODE 636 W/ 250 OVERRIDE (IP): Performed by: ANESTHESIOLOGY

## 2022-07-18 PROCEDURE — 700101 HCHG RX REV CODE 250: Performed by: ANESTHESIOLOGY

## 2022-07-18 PROCEDURE — 160048 HCHG OR STATISTICAL LEVEL 1-5: Performed by: OTOLARYNGOLOGY

## 2022-07-18 PROCEDURE — 160025 RECOVERY II MINUTES (STATS): Performed by: OTOLARYNGOLOGY

## 2022-07-18 PROCEDURE — 160028 HCHG SURGERY MINUTES - 1ST 30 MINS LEVEL 3: Performed by: OTOLARYNGOLOGY

## 2022-07-18 PROCEDURE — 160046 HCHG PACU - 1ST 60 MINS PHASE II: Performed by: OTOLARYNGOLOGY

## 2022-07-18 PROCEDURE — 160009 HCHG ANES TIME/MIN: Performed by: OTOLARYNGOLOGY

## 2022-07-18 PROCEDURE — 00160 ANES PX NOSE&SINUS NOS: CPT | Performed by: ANESTHESIOLOGY

## 2022-07-18 PROCEDURE — 700105 HCHG RX REV CODE 258: Performed by: OTOLARYNGOLOGY

## 2022-07-18 PROCEDURE — 700102 HCHG RX REV CODE 250 W/ 637 OVERRIDE(OP)

## 2022-07-18 PROCEDURE — 160039 HCHG SURGERY MINUTES - EA ADDL 1 MIN LEVEL 3: Performed by: OTOLARYNGOLOGY

## 2022-07-18 PROCEDURE — A9270 NON-COVERED ITEM OR SERVICE: HCPCS

## 2022-07-18 RX ORDER — SODIUM CHLORIDE, SODIUM LACTATE, POTASSIUM CHLORIDE, CALCIUM CHLORIDE 600; 310; 30; 20 MG/100ML; MG/100ML; MG/100ML; MG/100ML
INJECTION, SOLUTION INTRAVENOUS CONTINUOUS
Status: DISCONTINUED | OUTPATIENT
Start: 2022-07-18 | End: 2022-07-18 | Stop reason: HOSPADM

## 2022-07-18 RX ORDER — MIDAZOLAM HYDROCHLORIDE 1 MG/ML
INJECTION INTRAMUSCULAR; INTRAVENOUS PRN
Status: DISCONTINUED | OUTPATIENT
Start: 2022-07-18 | End: 2022-07-18 | Stop reason: SURG

## 2022-07-18 RX ORDER — HYDROMORPHONE HYDROCHLORIDE 1 MG/ML
0.1 INJECTION, SOLUTION INTRAMUSCULAR; INTRAVENOUS; SUBCUTANEOUS
Status: DISCONTINUED | OUTPATIENT
Start: 2022-07-18 | End: 2022-07-18 | Stop reason: HOSPADM

## 2022-07-18 RX ORDER — LIDOCAINE HYDROCHLORIDE AND EPINEPHRINE 10; 10 MG/ML; UG/ML
INJECTION, SOLUTION INFILTRATION; PERINEURAL
Status: DISCONTINUED
Start: 2022-07-18 | End: 2022-07-18 | Stop reason: HOSPADM

## 2022-07-18 RX ORDER — OXYCODONE HCL 5 MG/5 ML
5 SOLUTION, ORAL ORAL
Status: DISCONTINUED | OUTPATIENT
Start: 2022-07-18 | End: 2022-07-18 | Stop reason: HOSPADM

## 2022-07-18 RX ORDER — HALOPERIDOL 5 MG/ML
1 INJECTION INTRAMUSCULAR
Status: DISCONTINUED | OUTPATIENT
Start: 2022-07-18 | End: 2022-07-18 | Stop reason: HOSPADM

## 2022-07-18 RX ORDER — OXYMETAZOLINE HYDROCHLORIDE 0.05 G/100ML
SPRAY NASAL
Status: COMPLETED
Start: 2022-07-18 | End: 2022-07-18

## 2022-07-18 RX ORDER — LIDOCAINE HYDROCHLORIDE 40 MG/ML
SOLUTION TOPICAL
Status: DISCONTINUED
Start: 2022-07-18 | End: 2022-07-18 | Stop reason: HOSPADM

## 2022-07-18 RX ORDER — ONDANSETRON 2 MG/ML
4 INJECTION INTRAMUSCULAR; INTRAVENOUS
Status: DISCONTINUED | OUTPATIENT
Start: 2022-07-18 | End: 2022-07-18 | Stop reason: HOSPADM

## 2022-07-18 RX ORDER — HYDROMORPHONE HYDROCHLORIDE 1 MG/ML
0.2 INJECTION, SOLUTION INTRAMUSCULAR; INTRAVENOUS; SUBCUTANEOUS
Status: DISCONTINUED | OUTPATIENT
Start: 2022-07-18 | End: 2022-07-18 | Stop reason: HOSPADM

## 2022-07-18 RX ORDER — ALBUTEROL SULFATE 2.5 MG/3ML
2.5 SOLUTION RESPIRATORY (INHALATION)
Status: DISCONTINUED | OUTPATIENT
Start: 2022-07-18 | End: 2022-07-18 | Stop reason: HOSPADM

## 2022-07-18 RX ORDER — OXYMETAZOLINE HYDROCHLORIDE 0.05 G/100ML
2 SPRAY NASAL
Status: COMPLETED | OUTPATIENT
Start: 2022-07-18 | End: 2022-07-18

## 2022-07-18 RX ORDER — OXYCODONE HCL 5 MG/5 ML
10 SOLUTION, ORAL ORAL
Status: DISCONTINUED | OUTPATIENT
Start: 2022-07-18 | End: 2022-07-18 | Stop reason: HOSPADM

## 2022-07-18 RX ORDER — BACITRACIN ZINC 500 [USP'U]/G
OINTMENT TOPICAL
Status: DISCONTINUED
Start: 2022-07-18 | End: 2022-07-18 | Stop reason: HOSPADM

## 2022-07-18 RX ORDER — LIDOCAINE HYDROCHLORIDE 20 MG/ML
INJECTION, SOLUTION EPIDURAL; INFILTRATION; INTRACAUDAL; PERINEURAL PRN
Status: DISCONTINUED | OUTPATIENT
Start: 2022-07-18 | End: 2022-07-18 | Stop reason: SURG

## 2022-07-18 RX ORDER — HYDROMORPHONE HYDROCHLORIDE 1 MG/ML
0.4 INJECTION, SOLUTION INTRAMUSCULAR; INTRAVENOUS; SUBCUTANEOUS
Status: DISCONTINUED | OUTPATIENT
Start: 2022-07-18 | End: 2022-07-18 | Stop reason: HOSPADM

## 2022-07-18 RX ORDER — DIPHENHYDRAMINE HYDROCHLORIDE 50 MG/ML
12.5 INJECTION INTRAMUSCULAR; INTRAVENOUS
Status: DISCONTINUED | OUTPATIENT
Start: 2022-07-18 | End: 2022-07-18 | Stop reason: HOSPADM

## 2022-07-18 RX ADMIN — OXYMETAZOLINE HCL 2 SPRAY: 0.05 SPRAY NASAL at 09:56

## 2022-07-18 RX ADMIN — OXYMETAZOLINE HYDROCHLORIDE 2 SPRAY: 0.05 SPRAY NASAL at 09:56

## 2022-07-18 RX ADMIN — LIDOCAINE HYDROCHLORIDE 40 MG: 20 INJECTION, SOLUTION EPIDURAL; INFILTRATION; INTRACAUDAL at 12:34

## 2022-07-18 RX ADMIN — SODIUM CHLORIDE, POTASSIUM CHLORIDE, SODIUM LACTATE AND CALCIUM CHLORIDE: 600; 310; 30; 20 INJECTION, SOLUTION INTRAVENOUS at 09:57

## 2022-07-18 RX ADMIN — PROPOFOL 100 MCG/KG/MIN: 10 INJECTION, EMULSION INTRAVENOUS at 12:31

## 2022-07-18 RX ADMIN — MIDAZOLAM HYDROCHLORIDE 2 MG: 1 INJECTION, SOLUTION INTRAMUSCULAR; INTRAVENOUS at 12:26

## 2022-07-18 NOTE — ANESTHESIA TIME REPORT
Anesthesia Start and Stop Event Times     Date Time Event    7/18/2022 1212 Ready for Procedure     1225 Anesthesia Start     1304 Anesthesia Stop        Responsible Staff  07/18/22    Name Role Begin End    Semaj Barnett M.D. Anesth 1225 1304        Overtime Reason:  no overtime (within assigned shift)    Comments:

## 2022-07-18 NOTE — ANESTHESIA PREPROCEDURE EVALUATION
Case: [id number] Date/Time: 07/18/22 1045    Procedure: DRUG INDUCED SLEEP ENDOSCOPY (N/A Nose)    Anesthesia type: General    Pre-op diagnosis: OBSTRUCTIVE SLEEP APNEA    Location: [facility] ROOM 28 / SURGERY SAME DAY [facility]    Surgeons: Aguilar Raza M.D.      GLENN  Greater than ideal weight    Relevant Problems   No relevant active problems       Physical Exam    Anesthesia Plan    ASA 2       Plan - MAC         (Iv gen)      Induction: intravenous    Postoperative Plan: Postoperative administration of opioids is intended.    Pertinent diagnostic labs and testing reviewed    Informed Consent:    Anesthetic plan and risks discussed with patient.    Use of blood products discussed with: patient whom consented to blood products.

## 2022-07-18 NOTE — ANESTHESIA POSTPROCEDURE EVALUATION
Patient: Gonzalo Mao Jr.    Procedure Summary     Date: 07/18/22 Room / Location: [facility] ROOM 28 / SURGERY SAME DAY [facility]    Anesthesia Start: 1225 Anesthesia Stop: 1304    Procedure: DRUG INDUCED SLEEP ENDOSCOPY (N/A Nose) Diagnosis: (OBSTRUCTIVE SLEEP APNEA)    Surgeons: Aguilar Raza M.D. Responsible Provider: Semaj Barnett M.D.    Anesthesia Type: MAC ASA Status: 2          Final Anesthesia Type: MAC  Last vitals  BP   Blood Pressure : (!) 174/82    Temp   36.3 °C (97.4 °F)    Pulse   76   Resp   20    SpO2   93 %      Anesthesia Post Evaluation    Patient location during evaluation: PACU  Patient participation: complete - patient participated  Level of consciousness: awake and alert    Airway patency: patent  Anesthetic complications: no  Cardiovascular status: hemodynamically stable  Respiratory status: acceptable  Hydration status: euvolemic  Comments: Patient not complaining of pain in left AC where IV infiltrated.  Tissue  Is full but not tense.  Cold compress and ovservation    PONV: none          No complications documented.

## 2022-07-18 NOTE — OR NURSING
1301 - Pt to PACU 9 from OR.  Bedside report from anesthesiologist and RN.  Attached to monitoring, VSS, breathing is calm and unlabored.     1340 - Pt stable to discharge.  Instructions given, patient and wife Hannah verbalize understanding.  IV And armbands removed.  Taken via wheelchair to car.  Pt has all belongings with them.

## 2022-07-18 NOTE — ANESTHESIA PROCEDURE NOTES
Peripheral IV    Date/Time: 7/18/2022 12:42 PM  Performed by: Semaj Barnett M.D.  Authorized by: Semaj Barnett M.D.     Size:  20 G  Laterality:  Right  Local Anesthetic:  Lidocaine 1%  Site Prep:  Alcohol  Technique:  Direct puncture

## 2022-07-18 NOTE — OP REPORT
DATE OF SERVICE: 7/18/22     PREOPERATIVE DIAGNOSES:  Obstructive sleep apnea with positive pressure   intolerance and persistent sleep apnea after CPAP use.     POSTOPERATIVE DIAGNOSES:  Obstructive sleep apnea with positive pressure   intolerance and persistent sleep apnea after CPAP use.     PROCEDURE PERFORMED:  Drug-induced sleep endoscopy/flexible laryngoscopy with   examination under anesthesia.     ANESTHESIA TYPE:  IV sedation.     ESTIMATED BLOOD LOSS:  None.     COMPLICATIONS:  None.     BRIEF CLINICAL HISTORY:  Patient presents with a history of   moderate to severe symptomatic obstructive sleep apnea who is intolerant and   unable to achieve benefit with positive pressure therapy.  He presents today   for drug-induced sleep endoscopy to better characterize her localizations and   pattern of obstruction to predict appropriate medical and/or surgical options   moving forward.     PROCEDURAL FINDINGS:  There was no evidence of a complete concentric palatal   obstruction and he does appear to be an excellent candidate anatomically for hypoglossal   nerve stimulation therapy.     DESCRIPTION OF PROCEDURE:  The patient was brought to the operating room and   was anesthetized via the standard drug-induced sleep endoscopy protocol.  The   propofol infusion rate was started at 75 mcg and increased to 150 at which   point conditions that mimic sleep were gradually observed.  Under these   conditions, a flexible scope was inserted to examine both sides of the nasal   cavity as well as the pharynx and larynx.  The VOTE score at baseline was   complete anterior posterior collapse with simulated jaw advancement and tongue   advancement.  The hypopharyngeal obstruction and secondarily the palatal   collapse also improved.  In summary, there is no evidence of a complete   concentric palatal obstruction and he does appear to be a candidate   anatomically for hypoglossal nerve stimulation therapy.  I was present for  and   performed the entire procedure today.           ______________________________  Aguilar Raza MD

## 2022-07-18 NOTE — DISCHARGE INSTRUCTIONS
HOME CARE INSTRUCTIONS    ACTIVITY: Rest and take it easy for the first 24 hours.  A responsible adult is recommended to remain with you during that time.  It is normal to feel sleepy.  We encourage you to not do anything that requires balance, judgment or coordination.    FOR 24 HOURS DO NOT:  Drive, operate machinery or run household appliances.  Drink beer or alcoholic beverages.  Make important decisions or sign legal documents.    DIET: To avoid nausea, slowly advance diet as tolerated, avoiding spicy or greasy foods for the first day.  Add more substantial food to your diet according to your physician's instructions.  Babies can be fed formula or breast milk as soon as they are hungry.  INCREASE FLUIDS AND FIBER TO AVOID CONSTIPATION.    MEDICATIONS: Resume taking daily medication.  Take prescribed pain medication with food.  If no medication is prescribed, you may take non-aspirin pain medication if needed.  PAIN MEDICATION CAN BE VERY CONSTIPATING.  Take a stool softener or laxative such as senokot, pericolace, or milk of magnesia if needed.    A follow-up appointment should be arranged with your doctor; call to schedule.    You should CALL YOUR PHYSICIAN if you develop:  Fever greater than 101 degrees F.  Pain not relieved by medication, or persistent nausea or vomiting.  Excessive bleeding (blood soaking through dressing) or unexpected drainage from the wound.  Extreme redness or swelling around the incision site, drainage of pus or foul smelling drainage.  Inability to urinate or empty your bladder within 8 hours.  Problems with breathing or chest pain.    You should call 911 if you develop problems with breathing or chest pain.  If you are unable to contact your doctor or surgical center, you should go to the nearest emergency room or urgent care center.    Physician's telephone #: Dr. Raza [phone]    MILD FLU-LIKE SYMPTOMS ARE NORMAL.  YOU MAY EXPERIENCE GENERALIZED MUSCLE ACHES, THROAT  IRRITATION, HEADACHE AND/OR SOME NAUSEA.    If any questions arise, call your doctor.  If your doctor is not available, please feel free to call the Surgical Center at [phone].  The Center is open Monday through Friday from 7AM to 7PM.      A registered nurse may call you a few days after your surgery to see how you are doing after your procedure.    You may also receive a survey in the mail within the next two weeks and we ask that you take a few moments to complete the survey and return it to us.  Our goal is to provide you with very good care and we value your comments.     Depression / Suicide Risk    As you are discharged from this [facility] facility, it is important to learn how to keep safe from harming yourself.    Recognize the warning signs:  Abrupt changes in personality, positive or negative- including increase in energy   Giving away possessions  Change in eating patterns- significant weight changes-  positive or negative  Change in sleeping patterns- unable to sleep or sleeping all the time   Unwillingness or inability to communicate  Depression  Unusual sadness, discouragement and loneliness  Talk of wanting to die  Neglect of personal appearance   Rebelliousness- reckless behavior  Withdrawal from people/activities they love  Confusion- inability to concentrate     If you or a loved one observes any of these behaviors or has concerns about self-harm, here's what you can do:  Talk about it- your feelings and reasons for harming yourself  Remove any means that you might use to hurt yourself (examples: pills, rope, extension cords, firearm)  Get professional help from the community (Mental Health, Substance Abuse, psychological counseling)  Do not be alone:Call your Safe Contact- someone whom you trust who will be there for you.  Call your local CRISIS HOTLINE 599-9896 or 710-774-0505  Call your local Children's Mobile Crisis Response Team [place] (451) 641-3657 or  www.[employer]Tree  Call the toll free National Suicide Prevention Hotlines   National Suicide Prevention Lifeline 018-271-MGSK (4746)  National [place] Line Network 800-SUICIDE (062-6629)    I acknowledge receipt and understanding of these Home Care instructions.

## 2022-08-11 ENCOUNTER — PATIENT MESSAGE (OUTPATIENT)
Dept: SLEEP MEDICINE | Facility: MEDICAL CENTER | Age: 67
End: 2022-08-11
Payer: MEDICARE

## 2022-08-15 NOTE — PATIENT COMMUNICATION
called pt and schedule inspire activation on 10/25/2022 @ 3:00 pm with Dr. Martines. Informed pt that he would need to be seen 4 weeks out from the day of his surgery. Pt stated that he would be dead by the time he gets the device turn on and pt stated that he would like his device turn on before that. I informed the pt that per inspire he would need to be seen by us and the inspire team pt understood. I also provided pt with address of the clinic and time.

## 2022-08-30 ENCOUNTER — PRE-ADMISSION TESTING (OUTPATIENT)
Dept: ADMISSIONS | Facility: MEDICAL CENTER | Age: 67
End: 2022-08-30
Attending: OTOLARYNGOLOGY
Payer: MEDICARE

## 2022-08-31 NOTE — PREPROCEDURE INSTRUCTIONS
RN preadmission appointment completed with Johnathan Mao. He states he takes Imuran and sulfasalazine for an autoimmune disorder but denies having rheumatoid arthritis. I told johnathan to notify Dr. Raza's office that he is taking these medications so he can be advised on administration prior to surgery.

## 2022-09-19 ENCOUNTER — HOSPITAL ENCOUNTER (OUTPATIENT)
Facility: MEDICAL CENTER | Age: 67
End: 2022-09-19
Attending: OTOLARYNGOLOGY | Admitting: OTOLARYNGOLOGY
Payer: MEDICARE

## 2022-09-19 ENCOUNTER — APPOINTMENT (OUTPATIENT)
Dept: RADIOLOGY | Facility: MEDICAL CENTER | Age: 67
End: 2022-09-19
Attending: OTOLARYNGOLOGY
Payer: MEDICARE

## 2022-09-19 ENCOUNTER — ANESTHESIA (OUTPATIENT)
Dept: SURGERY | Facility: MEDICAL CENTER | Age: 67
End: 2022-09-19
Payer: MEDICARE

## 2022-09-19 ENCOUNTER — ANESTHESIA EVENT (OUTPATIENT)
Dept: SURGERY | Facility: MEDICAL CENTER | Age: 67
End: 2022-09-19
Payer: MEDICARE

## 2022-09-19 VITALS
WEIGHT: 242.06 LBS | HEIGHT: 70 IN | DIASTOLIC BLOOD PRESSURE: 74 MMHG | HEART RATE: 66 BPM | RESPIRATION RATE: 18 BRPM | OXYGEN SATURATION: 93 % | TEMPERATURE: 97.5 F | SYSTOLIC BLOOD PRESSURE: 170 MMHG | BODY MASS INDEX: 34.65 KG/M2

## 2022-09-19 DIAGNOSIS — G89.18 ACUTE POST-OPERATIVE PAIN: ICD-10-CM

## 2022-09-19 PROCEDURE — 160009 HCHG ANES TIME/MIN: Performed by: OTOLARYNGOLOGY

## 2022-09-19 PROCEDURE — 700101 HCHG RX REV CODE 250: Performed by: ANESTHESIOLOGY

## 2022-09-19 PROCEDURE — 160046 HCHG PACU - 1ST 60 MINS PHASE II: Performed by: OTOLARYNGOLOGY

## 2022-09-19 PROCEDURE — 160025 RECOVERY II MINUTES (STATS): Performed by: OTOLARYNGOLOGY

## 2022-09-19 PROCEDURE — 160048 HCHG OR STATISTICAL LEVEL 1-5: Performed by: OTOLARYNGOLOGY

## 2022-09-19 PROCEDURE — A9270 NON-COVERED ITEM OR SERVICE: HCPCS | Performed by: OTOLARYNGOLOGY

## 2022-09-19 PROCEDURE — C1778 LEAD, NEUROSTIMULATOR: HCPCS | Performed by: OTOLARYNGOLOGY

## 2022-09-19 PROCEDURE — 700105 HCHG RX REV CODE 258: Performed by: OTOLARYNGOLOGY

## 2022-09-19 PROCEDURE — 700111 HCHG RX REV CODE 636 W/ 250 OVERRIDE (IP): Performed by: ANESTHESIOLOGY

## 2022-09-19 PROCEDURE — 160029 HCHG SURGERY MINUTES - 1ST 30 MINS LEVEL 4: Performed by: OTOLARYNGOLOGY

## 2022-09-19 PROCEDURE — 160041 HCHG SURGERY MINUTES - EA ADDL 1 MIN LEVEL 4: Performed by: OTOLARYNGOLOGY

## 2022-09-19 PROCEDURE — 71045 X-RAY EXAM CHEST 1 VIEW: CPT

## 2022-09-19 PROCEDURE — A9270 NON-COVERED ITEM OR SERVICE: HCPCS | Performed by: ANESTHESIOLOGY

## 2022-09-19 PROCEDURE — 700101 HCHG RX REV CODE 250: Performed by: OTOLARYNGOLOGY

## 2022-09-19 PROCEDURE — 160035 HCHG PACU - 1ST 60 MINS PHASE I: Performed by: OTOLARYNGOLOGY

## 2022-09-19 PROCEDURE — C1767 GENERATOR, NEURO NON-RECHARG: HCPCS | Performed by: OTOLARYNGOLOGY

## 2022-09-19 PROCEDURE — 700102 HCHG RX REV CODE 250 W/ 637 OVERRIDE(OP): Performed by: ANESTHESIOLOGY

## 2022-09-19 PROCEDURE — 160002 HCHG RECOVERY MINUTES (STAT): Performed by: OTOLARYNGOLOGY

## 2022-09-19 PROCEDURE — 00300 ANES ALL PX INTEG H/N/PTRUNK: CPT | Performed by: ANESTHESIOLOGY

## 2022-09-19 PROCEDURE — 700102 HCHG RX REV CODE 250 W/ 637 OVERRIDE(OP): Performed by: OTOLARYNGOLOGY

## 2022-09-19 DEVICE — IMPLANTABLE STIMULATION LEAD INSPIRE (1EA): Type: IMPLANTABLE DEVICE | Site: NECK | Status: FUNCTIONAL

## 2022-09-19 DEVICE — IMPLANTABLE PULSE GENERATOR INSPIRE (1EA): Type: IMPLANTABLE DEVICE | Site: CHEST | Status: FUNCTIONAL

## 2022-09-19 DEVICE — IMPLANTABLE RESPIRATORY SENSING LEAD INSPIRE (1EA): Type: IMPLANTABLE DEVICE | Site: CHEST | Status: FUNCTIONAL

## 2022-09-19 RX ORDER — OXYCODONE HCL 5 MG/5 ML
5 SOLUTION, ORAL ORAL
Status: COMPLETED | OUTPATIENT
Start: 2022-09-19 | End: 2022-09-19

## 2022-09-19 RX ORDER — HYDRALAZINE HYDROCHLORIDE 20 MG/ML
5 INJECTION INTRAMUSCULAR; INTRAVENOUS
Status: DISCONTINUED | OUTPATIENT
Start: 2022-09-19 | End: 2022-09-19 | Stop reason: HOSPADM

## 2022-09-19 RX ORDER — EPINEPHRINE 1 MG/ML(1)
AMPUL (ML) INJECTION
Status: DISCONTINUED
Start: 2022-09-19 | End: 2022-09-19 | Stop reason: HOSPADM

## 2022-09-19 RX ORDER — BACITRACIN ZINC 500 [USP'U]/G
OINTMENT TOPICAL
Status: DISCONTINUED | OUTPATIENT
Start: 2022-09-19 | End: 2022-09-19 | Stop reason: HOSPADM

## 2022-09-19 RX ORDER — MIDAZOLAM HYDROCHLORIDE 1 MG/ML
INJECTION INTRAMUSCULAR; INTRAVENOUS PRN
Status: DISCONTINUED | OUTPATIENT
Start: 2022-09-19 | End: 2022-09-19 | Stop reason: SURG

## 2022-09-19 RX ORDER — OXYCODONE HCL 5 MG/5 ML
10 SOLUTION, ORAL ORAL
Status: COMPLETED | OUTPATIENT
Start: 2022-09-19 | End: 2022-09-19

## 2022-09-19 RX ORDER — SODIUM CHLORIDE, SODIUM LACTATE, POTASSIUM CHLORIDE, CALCIUM CHLORIDE 600; 310; 30; 20 MG/100ML; MG/100ML; MG/100ML; MG/100ML
INJECTION, SOLUTION INTRAVENOUS CONTINUOUS
Status: DISCONTINUED | OUTPATIENT
Start: 2022-09-19 | End: 2022-09-19 | Stop reason: HOSPADM

## 2022-09-19 RX ORDER — HYDROMORPHONE HYDROCHLORIDE 1 MG/ML
0.4 INJECTION, SOLUTION INTRAMUSCULAR; INTRAVENOUS; SUBCUTANEOUS
Status: DISCONTINUED | OUTPATIENT
Start: 2022-09-19 | End: 2022-09-19 | Stop reason: HOSPADM

## 2022-09-19 RX ORDER — ONDANSETRON 2 MG/ML
INJECTION INTRAMUSCULAR; INTRAVENOUS PRN
Status: DISCONTINUED | OUTPATIENT
Start: 2022-09-19 | End: 2022-09-19 | Stop reason: SURG

## 2022-09-19 RX ORDER — HYDROMORPHONE HYDROCHLORIDE 1 MG/ML
0.5 INJECTION, SOLUTION INTRAMUSCULAR; INTRAVENOUS; SUBCUTANEOUS
Status: DISCONTINUED | OUTPATIENT
Start: 2022-09-19 | End: 2022-09-19 | Stop reason: HOSPADM

## 2022-09-19 RX ORDER — LIDOCAINE HYDROCHLORIDE AND EPINEPHRINE 10; 10 MG/ML; UG/ML
INJECTION, SOLUTION INFILTRATION; PERINEURAL
Status: DISCONTINUED | OUTPATIENT
Start: 2022-09-19 | End: 2022-09-19 | Stop reason: HOSPADM

## 2022-09-19 RX ORDER — LIDOCAINE HYDROCHLORIDE 10 MG/ML
INJECTION, SOLUTION EPIDURAL; INFILTRATION; INTRACAUDAL; PERINEURAL
Status: DISCONTINUED
Start: 2022-09-19 | End: 2022-09-19 | Stop reason: HOSPADM

## 2022-09-19 RX ORDER — DIPHENHYDRAMINE HYDROCHLORIDE 50 MG/ML
12.5 INJECTION INTRAMUSCULAR; INTRAVENOUS
Status: DISCONTINUED | OUTPATIENT
Start: 2022-09-19 | End: 2022-09-19 | Stop reason: HOSPADM

## 2022-09-19 RX ORDER — OXYMETAZOLINE HYDROCHLORIDE 0.05 G/100ML
2 SPRAY NASAL
Status: DISCONTINUED | OUTPATIENT
Start: 2022-09-19 | End: 2022-09-19 | Stop reason: HOSPADM

## 2022-09-19 RX ORDER — HYDROMORPHONE HYDROCHLORIDE 1 MG/ML
0.2 INJECTION, SOLUTION INTRAMUSCULAR; INTRAVENOUS; SUBCUTANEOUS
Status: DISCONTINUED | OUTPATIENT
Start: 2022-09-19 | End: 2022-09-19 | Stop reason: HOSPADM

## 2022-09-19 RX ORDER — HALOPERIDOL 5 MG/ML
1 INJECTION INTRAMUSCULAR
Status: DISCONTINUED | OUTPATIENT
Start: 2022-09-19 | End: 2022-09-19 | Stop reason: HOSPADM

## 2022-09-19 RX ORDER — ROCURONIUM BROMIDE 10 MG/ML
INJECTION, SOLUTION INTRAVENOUS PRN
Status: DISCONTINUED | OUTPATIENT
Start: 2022-09-19 | End: 2022-09-19 | Stop reason: SURG

## 2022-09-19 RX ORDER — LIDOCAINE HYDROCHLORIDE 20 MG/ML
INJECTION, SOLUTION EPIDURAL; INFILTRATION; INTRACAUDAL; PERINEURAL PRN
Status: DISCONTINUED | OUTPATIENT
Start: 2022-09-19 | End: 2022-09-19 | Stop reason: SURG

## 2022-09-19 RX ORDER — BACITRACIN ZINC 500 [USP'U]/G
OINTMENT TOPICAL
Status: DISCONTINUED
Start: 2022-09-19 | End: 2022-09-19 | Stop reason: HOSPADM

## 2022-09-19 RX ORDER — HYDROCODONE BITARTRATE AND ACETAMINOPHEN 7.5; 325 MG/1; MG/1
.5-1 TABLET ORAL EVERY 4 HOURS PRN
Qty: 10 TABLET | Refills: 0 | Status: SHIPPED | OUTPATIENT
Start: 2022-09-19 | End: 2022-09-22

## 2022-09-19 RX ORDER — CEFAZOLIN SODIUM 1 G/3ML
INJECTION, POWDER, FOR SOLUTION INTRAMUSCULAR; INTRAVENOUS PRN
Status: DISCONTINUED | OUTPATIENT
Start: 2022-09-19 | End: 2022-09-19 | Stop reason: SURG

## 2022-09-19 RX ORDER — ALBUTEROL SULFATE 2.5 MG/3ML
2.5 SOLUTION RESPIRATORY (INHALATION)
Status: DISCONTINUED | OUTPATIENT
Start: 2022-09-19 | End: 2022-09-19 | Stop reason: HOSPADM

## 2022-09-19 RX ORDER — SODIUM CHLORIDE, SODIUM LACTATE, POTASSIUM CHLORIDE, CALCIUM CHLORIDE 600; 310; 30; 20 MG/100ML; MG/100ML; MG/100ML; MG/100ML
INJECTION, SOLUTION INTRAVENOUS CONTINUOUS
Status: DISCONTINUED | OUTPATIENT
Start: 2022-09-19 | End: 2022-09-19

## 2022-09-19 RX ORDER — DEXAMETHASONE SODIUM PHOSPHATE 4 MG/ML
INJECTION, SOLUTION INTRA-ARTICULAR; INTRALESIONAL; INTRAMUSCULAR; INTRAVENOUS; SOFT TISSUE PRN
Status: DISCONTINUED | OUTPATIENT
Start: 2022-09-19 | End: 2022-09-19 | Stop reason: SURG

## 2022-09-19 RX ORDER — LABETALOL HYDROCHLORIDE 5 MG/ML
5 INJECTION, SOLUTION INTRAVENOUS
Status: DISCONTINUED | OUTPATIENT
Start: 2022-09-19 | End: 2022-09-19 | Stop reason: HOSPADM

## 2022-09-19 RX ADMIN — FENTANYL CITRATE 50 MCG: 50 INJECTION, SOLUTION INTRAMUSCULAR; INTRAVENOUS at 13:07

## 2022-09-19 RX ADMIN — SODIUM CHLORIDE, POTASSIUM CHLORIDE, SODIUM LACTATE AND CALCIUM CHLORIDE: 600; 310; 30; 20 INJECTION, SOLUTION INTRAVENOUS at 11:43

## 2022-09-19 RX ADMIN — PROPOFOL 50 MG: 10 INJECTION, EMULSION INTRAVENOUS at 13:30

## 2022-09-19 RX ADMIN — ROCURONIUM BROMIDE 50 MG: 10 INJECTION, SOLUTION INTRAVENOUS at 11:49

## 2022-09-19 RX ADMIN — CEFAZOLIN 2 G: 330 INJECTION, POWDER, FOR SOLUTION INTRAMUSCULAR; INTRAVENOUS at 11:53

## 2022-09-19 RX ADMIN — FENTANYL CITRATE 50 MCG: 50 INJECTION, SOLUTION INTRAMUSCULAR; INTRAVENOUS at 14:10

## 2022-09-19 RX ADMIN — DEXAMETHASONE SODIUM PHOSPHATE 8 MG: 4 INJECTION, SOLUTION INTRA-ARTICULAR; INTRALESIONAL; INTRAMUSCULAR; INTRAVENOUS; SOFT TISSUE at 11:57

## 2022-09-19 RX ADMIN — SUGAMMADEX 200 MG: 100 INJECTION, SOLUTION INTRAVENOUS at 13:17

## 2022-09-19 RX ADMIN — LIDOCAINE HYDROCHLORIDE 100 MG: 20 INJECTION, SOLUTION EPIDURAL; INFILTRATION; INTRACAUDAL at 11:48

## 2022-09-19 RX ADMIN — OXYCODONE HYDROCHLORIDE 10 MG: 5 SOLUTION ORAL at 14:09

## 2022-09-19 RX ADMIN — MIDAZOLAM HYDROCHLORIDE 2 MG: 1 INJECTION, SOLUTION INTRAMUSCULAR; INTRAVENOUS at 11:44

## 2022-09-19 RX ADMIN — FENTANYL CITRATE 50 MCG: 50 INJECTION, SOLUTION INTRAMUSCULAR; INTRAVENOUS at 12:42

## 2022-09-19 RX ADMIN — PROPOFOL 150 MG: 10 INJECTION, EMULSION INTRAVENOUS at 11:49

## 2022-09-19 RX ADMIN — ONDANSETRON 4 MG: 2 INJECTION INTRAMUSCULAR; INTRAVENOUS at 13:17

## 2022-09-19 RX ADMIN — FENTANYL CITRATE 100 MCG: 50 INJECTION, SOLUTION INTRAMUSCULAR; INTRAVENOUS at 11:48

## 2022-09-19 ASSESSMENT — PAIN DESCRIPTION - PAIN TYPE
TYPE: SURGICAL PAIN

## 2022-09-19 NOTE — ANESTHESIA PROCEDURE NOTES
Airway    Date/Time: 9/19/2022 11:51 AM  Performed by: Jesus Gardner M.D.  Authorized by: Jesus Gardner M.D.     Location:  OR  Urgency:  Elective  Difficult Airway: Yes     Thick neck   Indications for Airway Management:  Anesthesia      Spontaneous Ventilation: absent    Sedation Level:  Deep  Preoxygenated: Yes    Patient Position:  Sniffing  Mask Difficulty Assessment:  1 - vent by mask  Final Airway Type:  Endotracheal airway  Final Endotracheal Airway:  ETT  Cuffed: Yes    Technique Used for Successful ETT Placement:  Video laryngoscopy    Insertion Site:  Oral  Blade Type:  Glide  Laryngoscope Blade/Videolaryngoscope Blade Size:  4  ETT Size (mm):  7.5  Measured from:  Gums  Placement Verified by: auscultation and capnometry    Cormack-Lehane Classification:  Grade I - full view of glottis  Number of Attempts at Approach:  1

## 2022-09-19 NOTE — ANESTHESIA PREPROCEDURE EVALUATION
Case: [id number] Date/Time: 09/19/22 1130    Procedure: INSERTION OF HYPOGLOSSAL NERVE NEUROSTIMULATOR ELECTRODE AND GENERATOR AND BREATHING SENSOR ELECTRODE RIGHT.    Pre-op diagnosis: OBSTRUCTIVE SLEEP APNEA, BODY MASS INDEX 34    Location: [facility] ROOM 28 / SURGERY SAME DAY [facility]    Surgeons: Aguilar Raza M.D.          Relevant Problems   No relevant active problems     Anes H&P:  PAST MEDICAL HISTORY:   66 y.o. male who presents for Procedure(s):  INSERTION OF HYPOGLOSSAL NERVE NEUROSTIMULATOR ELECTRODE AND GENERATOR AND BREATHING SENSOR ELECTRODE RIGHT..  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    knees, back   • Heart burn 08/30/2022    occasional   • High cholesterol    • Indigestion    • Sleep apnea 07/12/2022    +GLENN +CPAP   • Snoring        SMOKING/ALCOHOL/RECREATIONAL DRUG USE:  Social History     Tobacco Use   • Smoking status: Never   • Smokeless tobacco: Never   Vaping Use   • Vaping Use: Never used   Substance Use Topics   • Alcohol use: Not Currently   • Drug use: Not Currently     Social History     Substance and Sexual Activity   Drug Use Not Currently       PAST SURGICAL HISTORY:  Past Surgical History:   Procedure Laterality Date   • VT DISE DYN EVAL SLEEP DISORDERED BREATHING FLX DX N/A 7/18/2022    Procedure: DRUG INDUCED SLEEP ENDOSCOPY;  Surgeon: Aguilar Raza M.D.;  Location: SURGERY SAME DAY [facility];  Service: Ent   • OTHER  06/2022    epidural blocks   • KNEE ARTHROSCOPY Right 01/2011       ALLERGIES:   No Known Allergies    MEDICATIONS:  No current facility-administered medications on file prior to encounter.     Current Outpatient Medications on File Prior to Encounter   Medication Sig Dispense Refill   • HYDROcodone-acetaminophen (NORCO) 5-325 MG Tab per tablet Take 1 Tablet by mouth 2 times a day for 21 days. 42 Tablet 0   • Multiple Vitamin (MULTIVITAMIN PO) Take  by mouth.     • GABAPENTIN PO Take   by mouth.     • atorvastatin (LIPITOR) 10 MG Tab atorvastatin 10 mg tablet     • azaTHIOprine (IMURAN) 50 MG Tab TAKE 3 TABLETS BY MOUTH EVERY DAY AS DIRECTED     • sulfaSALAzine (AZULFIDINE) 500 MG Tab TAKE 2 TABLETS BY MOUTH TWICE DAILY AS DIRECTED     • tamsulosin (FLOMAX) 0.4 MG capsule Take 0.4 mg by mouth every day.         LABS:  No results found for: HEMOGLOBIN, HEMATOCRIT, WBC  No results found for: SODIUM, POTASSIUM, CHLORIDE, CO2, GLUCOSE, BUN, CALCIUM      PREVIOUS ANESTHETICS: See EMR  __________________________________________      Physical Exam    Airway   Mallampati: III  TM distance: >3 FB  Neck ROM: full       Cardiovascular - normal exam  Rhythm: regular  Rate: normal  (-) murmur     Dental - normal exam           Pulmonary - normal exam  Breath sounds clear to auscultation     Abdominal   (+) obese     Neurological - normal exam                 Anesthesia Plan    ASA 2       Plan - general       Airway plan will be ETT          Induction: intravenous    Postoperative Plan: Postoperative administration of opioids is intended.    Pertinent diagnostic labs and testing reviewed    Informed Consent:    Anesthetic plan and risks discussed with patient.    Use of blood products discussed with: patient whom consented to blood products.

## 2022-09-19 NOTE — ANESTHESIA TIME REPORT
Anesthesia Start and Stop Event Times     Date Time Event    9/19/2022 1137 Ready for Procedure     1143 Anesthesia Start     1350 Anesthesia Stop        Responsible Staff  09/19/22    Name Role Begin End    Jesus Gardner M.D. Anesth 1143 1350        Overtime Reason:  no overtime (within assigned shift)    Comments:

## 2022-09-19 NOTE — OR NURSING
1342 Patient arrived from OR to PACU 10. Connected to monitor and report received from anesthesia and RN. VSS. 6 L 02 via mask. Oral airway in place. Breaths calm, even, unlabored.     Incision to R chest and R neck; open to air; clean, dry, intact.      1343: OPA removed.     1345: Pt awake; on room air. Denies pain and nausea.     1355: Pt tolerating sips of water.     1404: Pt reports pain 8/10; pain medications given.     1418: Pt pain 3/10. Phase 1 complete.     1427: X-ray completed at bedside.     1437: Significant other, Hannah updated via phone.     1455:Pt dressed and resting in recliner.     1516: PIV removed intact.     1517: Pt escorted out with all personal belongings via wheelchair by RN.     1525: Discharge instructions provided to significant otherHannah at car. All questions answered.

## 2022-09-19 NOTE — OP REPORT
DATE OF OPERATION: 9/19/22     PREOPERATIVE DIAGNOSIS:Obstructive Sleep Apnea    POSTOPERATIVE DIAGNOSIS: Same    PROCEDURE PERFORMED: Right cranial nerve stimulation implant (Inspire)    SURGEON: Aguilar Raza M.D.    ANESTHESIA: General endotracheal anesthesia.    INDICATIONS: The patient is a 66 y.o.-year-old male with obstructive sleep apnea refractory to CPAP. They have passed their DISE evaluation.     FINDINGS:Implant stimulated well at the end of the case.    SPECIMEN: None    ESTIMATED BLOOD LOSS: 30 mL.    PROCEDURE: Following informed consent, the patient was properly identified, taken to the operating room and placed in supine position where general endotracheal anesthesia was administered. Intravenous antibiotics were administered by the anesthesiologist in correct time interval. Sequential compression devices were employed. The neck and chest were draped and prepared in the normal surgical fashion for this procedure with betadine and ioban dressing.     After two leads were placed in the right tongue and the right floor of mouth, patient was then   marked at the appropriate location for the 2 incisions in the right sub-mentum   and the right chest.      First, the submental area was addressed.  An incision   was made through the skin and platysma.  The anterior border of the   submandibular gland was developed and carried to the mylohyoid.  The digastric   nerve was retracted inferiorly with two 3-0 silks and secured in place with   clamps.  The mylohyoid was retracted anteriorly and the submandibular gland   was retracted posteriorly and the hypoglossal nerve was visualized.  The   fascia overlying it was carefully removed.  C1 was visualized inferiorly and   the vasa vasorum breakpoint was visualized.  Dissection was carried through   this area and a small pocket was created.  It was then stimulated and found to   be with inclusion branches while superior to this were found to be exclusion    branches.  The cuff was then placed around the nerve in this location and   secured with the previously placed 3-0 silks.  Saline was introduced   underneath of the cuff and then to the right neck incision, which was then   packed with a wet 4 x 4.      Attention was then turned to the chest where an   incision was made through the skin just inferior to the second clavicle.    Dissection was carried down to the anterior border of the pectoralis major and   a small pocket was created in this area inferiorly for the processor.  Blunt   dissection was carried through the pectoralis major to the anterior border of   the chest where the external and internal intercostal muscles were visualized.    Small pocket was then created through the external intercostal muscles and   the sensory probe was then placed in the pocket and secured anteriorly with   four 3-0 silks at the anchor site.  It was then further secured at the second   anchor site with four 3-0 silks.  The previously placed lead in the right neck   was then tunneled under the skin through the inferior flap into the chest   incision where it was then hooked up to the battery/processor along with the   sensory lead was then coiled underneath of the processor, which was then placed in   the chest pocket. Testing indicated an appropriate response. The processor was then   further secured with two 2-0 silks. Then the wound was closed with   interrupted 2-0 Vicryl for deep, 3-0 Vicryl for the skin and then a running   2-0 Prolene for the skin.  Attention was then turned back up to the neck where   it too was closed using a 2-0 Vicryl for the platysma, 3-0 Vicryl for the   deep dermal and then a running 5-0 subcuticular Monocryl for the skin with   overlying bacitracin ointment placed on both incisions.  At this point, my   portion of the procedure was terminated and the patient was turned back over   to anesthesia for emergence.    The patient tolerated the  procedure well and there were no apparent complication. All sponge, needle, and instrument counts were correct on 2 separate occasions. He  was awakened, extubated, and transferred to the recovery room in satisfactory condition.           ____________________________________   Aguilar Raza M.D.    DD: 12/6/2021  2:28 PM

## 2022-09-19 NOTE — DISCHARGE INSTRUCTIONS
-Keep incisions dry for 48 hours, then may get wet but do not soak     -clean incisions twice daily with gentle soap and apply bacitracin ointment      If any questions arise, call your provider.  If your provider is not available, please feel free to call the Surgical Center at [phone].    MEDICATIONS: Resume taking daily medication.  Take prescribed pain medication with food.  If no medication is prescribed, you may take non-aspirin pain medication if needed.  PAIN MEDICATION CAN BE VERY CONSTIPATING.  Take a stool softener or laxative such as senokot, pericolace, or milk of magnesia if needed.    Last pain medication given at     What to Expect Post Anesthesia    Rest and take it easy for the first 24 hours.  A responsible adult is recommended to remain with you during that time.  It is normal to feel sleepy.  We encourage you to not do anything that requires balance, judgment or coordination.    FOR 24 HOURS DO NOT:  Drive, operate machinery or run household appliances.  Drink beer or alcoholic beverages.  Make important decisions or sign legal documents.    To avoid nausea, slowly advance diet as tolerated, avoiding spicy or greasy foods for the first day.  Add more substantial food to your diet according to your provider's instructions.  INCREASE FLUIDS AND FIBER TO AVOID CONSTIPATION.    MILD FLU-LIKE SYMPTOMS ARE NORMAL.  YOU MAY EXPERIENCE GENERALIZED MUSCLE ACHES, THROAT IRRITATION, HEADACHE AND/OR SOME NAUSEA.

## 2022-09-19 NOTE — ANESTHESIA POSTPROCEDURE EVALUATION
Patient: Gonzalo Mao Jr.    Procedure Summary     Date: 09/19/22 Room / Location: [facility] ROOM 28 / SURGERY SAME DAY [facility]    Anesthesia Start: 1143 Anesthesia Stop: 1350    Procedure: INSERTION OF HYPOGLOSSAL NERVE NEUROSTIMULATOR ELECTRODE AND GENERATOR AND BREATHING SENSOR ELECTRODE RIGHT. (Right: Chest) Diagnosis: (OBSTRUCTIVE SLEEP APNEA, BODY MASS INDEX 34)    Surgeons: Aguilar Raza M.D. Responsible Provider: Jesus Gardner M.D.    Anesthesia Type: general ASA Status: 2          Final Anesthesia Type: general  Last vitals  BP   Blood Pressure : (!) 170/74    Temp   36.4 °C (97.5 °F)    Pulse   66   Resp   18    SpO2   93 %      Anesthesia Post Evaluation    Patient location during evaluation: PACU  Patient participation: complete - patient participated  Level of consciousness: sleepy but conscious    Airway patency: patent  Anesthetic complications: no  Cardiovascular status: hemodynamically stable  Respiratory status: acceptable  Hydration status: balanced    PONV: none          No notable events documented.     Nurse Pain Score: 3 (NPRS)

## 2022-10-25 ENCOUNTER — OFFICE VISIT (OUTPATIENT)
Dept: SLEEP MEDICINE | Facility: MEDICAL CENTER | Age: 67
End: 2022-10-25
Payer: MEDICARE

## 2022-10-25 VITALS
HEART RATE: 84 BPM | DIASTOLIC BLOOD PRESSURE: 88 MMHG | WEIGHT: 244 LBS | HEIGHT: 70 IN | BODY MASS INDEX: 34.93 KG/M2 | RESPIRATION RATE: 16 BRPM | SYSTOLIC BLOOD PRESSURE: 136 MMHG | OXYGEN SATURATION: 94 %

## 2022-10-25 DIAGNOSIS — G47.33 OSA (OBSTRUCTIVE SLEEP APNEA): ICD-10-CM

## 2022-10-25 DIAGNOSIS — Z45.42 ENCOUNTER FOR ADJUSTMENT AND MANAGEMENT OF NEUROSTIMULATOR: ICD-10-CM

## 2022-10-25 PROCEDURE — 95977 ALYS CPLX CN NPGT PRGRMG: CPT | Performed by: STUDENT IN AN ORGANIZED HEALTH CARE EDUCATION/TRAINING PROGRAM

## 2022-10-25 PROCEDURE — 99214 OFFICE O/P EST MOD 30 MIN: CPT | Mod: 25 | Performed by: STUDENT IN AN ORGANIZED HEALTH CARE EDUCATION/TRAINING PROGRAM

## 2022-10-25 NOTE — PROGRESS NOTES
[facility] Follow Up Note     Date: 10/25/2022 / Time: 3:16 PM    Patient ID:   Name:             Gonzalo Mao   YOB: 1955  Age:                 67 y.o.  male   MRN:               [id number]    CC: Activating his inspire device    HISTORY OF PRESENT ILLNESS:       Gonzalo Mao Jr. is a 67 y.o. male with hyperlipidemia, BPH, and severe obstructive sleep apnea on ASV therapy.  Presents to Sleep Clinic for activation of hypoglossal nerve stimulator.    He reports he is doing well since surgery.  Denies any trouble with pain.  Denies any speech impairment or trouble swallowing.  He does continue to use his ASV machine.  However he is frustrated with it and is excited to be able to stop using his PAP machine in the future.    Sleep Schedule  Bedtime: Weekday & Weekend 10pm  Wake time: Weekday & Weekend 630am  Sleep-onset latency: 5-10min   Awakenings from sleep: 1  Difficulty falling back asleep: No  Bedroom partner: yes    Device was interrogated, thresholds were obtained consisting of:    (+-+)  Sensation: 0.6 v, Functional Threshold: 0.6v:     Waveform to asses respiratory sense lead integrity was run and saved to  report: normal    Programmed settings   Activation settings:  1)Start Delay: 20 min  2)Pause Time: 15 min  3)Total Sleep Time: 8 hours  4)Pt Range(can change by 0.1):  0.4 v to 1.6 V  5)Pulse configuration (+-+)  6) voltage 0.6    SLEEP HISTORY   split night study on 7/18/21 which showed Severe obstructive sleep apnea with AHI of 97.8/hr and O2 liz 67 %. Due to severity of the disease he met the split study protocol. The titration started with CPAP 5 cm and the ending pressure was ResMed ASVAuto at EPAP 4/10, PS 3/10 cm. The AHI improved to 67.32/hr with improved O2 liz of 80% and average O2 saturation of 90 %. He spent 36 % of sleep time below 88% O2 saturation        REVIEW OF SYSTEMS  Constitutional: Denies fevers, Denies weight  changes  Ears/Nose/Throat/Mouth: Denies nasal congestion or sore throat, difficulty swallowing, speech changes, facial numbness  Cardiovascular: Denies chest pain or palpitations   Respiratory: Denies shortness of breath, Denies cough  Gastrointestinal/Hepatic: Denies abdominal pain, nausea, vomiting, diarrhea  Musculoskeletal/Rheum: Denies  joint pain and swelling   Sleep: See HPI    PMH:  has a past medical history of Arthritis (07/12/2022), Heart burn (08/30/2022), High cholesterol, Indigestion, Sleep apnea (07/12/2022), and Snoring.  MEDS:   Current Outpatient Medications:     Multiple Vitamin (MULTIVITAMIN PO), Take  by mouth., Disp: , Rfl:     GABAPENTIN PO, Take  by mouth., Disp: , Rfl:     atorvastatin (LIPITOR) 10 MG Tab, atorvastatin 10 mg tablet, Disp: , Rfl:     azaTHIOprine (IMURAN) 50 MG Tab, TAKE 3 TABLETS BY MOUTH EVERY DAY AS DIRECTED, Disp: , Rfl:     sulfaSALAzine (AZULFIDINE) 500 MG Tab, TAKE 2 TABLETS BY MOUTH TWICE DAILY AS DIRECTED, Disp: , Rfl:     tamsulosin (FLOMAX) 0.4 MG capsule, Take 0.4 mg by mouth every day., Disp: , Rfl:     HYDROcodone-acetaminophen (NORCO) 5-325 MG Tab per tablet, Take 1 Tablet by mouth 2 times a day for 21 days. (Patient not taking: Reported on 10/25/2022), Disp: 42 Tablet, Rfl: 0  ALLERGIES: No Known Allergies  SURGHX:   Past Surgical History:   Procedure Laterality Date    NC OPEN IMPLTJ HPGLSL NRV NSTIM RA PG&RESPIR SENSOR Right 9/19/2022    Procedure: INSERTION OF HYPOGLOSSAL NERVE NEUROSTIMULATOR ELECTRODE AND GENERATOR AND BREATHING SENSOR ELECTRODE RIGHT.;  Surgeon: Aguilar Raza M.D.;  Location: SURGERY SAME DAY [facility];  Service: Ent    NC DISE DYN EVAL SLEEP DISORDERED BREATHING FLX DX N/A 7/18/2022    Procedure: DRUG INDUCED SLEEP ENDOSCOPY;  Surgeon: Aguilar Raza M.D.;  Location: SURGERY SAME DAY [facility];  Service: Ent    OTHER  06/2022    epidural blocks    KNEE ARTHROSCOPY Right 01/2011     SOCHX:  reports that he has never smoked. He has never  "used smokeless tobacco. He reports that he does not currently use alcohol. He reports that he does not currently use drugs..  FH:   Family History   Problem Relation Age of Onset    Sleep Apnea Neg Hx          Physical Exam:  Vitals/ General Appearance:   Weight/BMI: Body mass index is 35.01 kg/m².  /88 (BP Location: Left arm, Patient Position: Sitting, BP Cuff Size: Large adult)   Pulse 84   Resp 16   Ht 1.778 m (5' 10\")   Wt 111 kg (244 lb)   SpO2 94%   Vitals:    10/25/22 1502   BP: 136/88   BP Location: Left arm   Patient Position: Sitting   BP Cuff Size: Large adult   Pulse: 84   Resp: 16   SpO2: 94%   Weight: 111 kg (244 lb)   Height: 1.778 m (5' 10\")       Pt. is alert and oriented to time, place and person. Cooperative and in no apparent distress.       Constitutional: Alert, no distress, well-groomed.  Skin: Surgical incision sites are C/D/I on chest and neck. No signs of hematoma, infection or drainage.  Eye: Round. Conjunctiva clear, lids normal. No icterus.   ENMT: Lips pink without lesions, good dentition, moist mucous membranes. Phonation normal.  Tongue: movement bilaterally intact, no fasciculations, no atrophy, Up and down movement intact.    Neck: No masses, no thyromegaly. Moves freely without pain.  CV: Pulse as reported by patient  Respiratory: Unlabored respiratory effort, no cough or audible wheeze  Psych: Alert and oriented x3, normal affect and mood.     ASSESSMENT AND PLAN   Gonzalo Mao Jr. is a 67 y.o. male with hyperlipidemia, BPH, and severe obstructive sleep apnea on ASV therapy.  Presents to Sleep Clinic for activation of hypoglossal nerve stimulator.    1.  Severe sleep Apnea .He  was previously on ASV however due to intolerance He had hypoglossal nerve stimulator implanted on 9/19/2022.     The pathophysiology of sleep anea and the increased risk of cardiovascular morbidity from untreated sleep apnea is discussed in detail with the patient. He  also has BPH " which can be worsened by her GLENN.     He is urged to avoid supine sleep, weight gain and alcoholic beverages since all of these can worsen sleep apnea. He is cautioned against drowsy driving. If He feels sleepy while driving, He must pull over for a break/nap, rather than persist on the road, in the interest of He own safety and that of others on the road.    Plan  - Hypoglossal nerve stimulator was interrogated.  Programming was done.  Activation settings:  1)Start Delay: 20 min  2)Pause Time: 15 min  3)Total Sleep Time: 8 hours  4)Pt Range(can change by 0.1):  0.4 v to 1.6 V  5)Pulse configuration (+-+)  6) voltage 0.6    - F/u in 4-6 weeks to assess the efficiacy of the treatment    - The patient will advance his voltage 1 setting once a week as tolerated.. He will reach out to us if any questions or concerns arise before then. Teaching performed with: batteries, remote use, how to titrate voltage and to providers if patient requires dental work or pacemaker in the future. Additional teaching included: MRI/CT/ultrasound/airport security screening precautions and compatibility. Handout was provided to the patient.     2. Regarding treatment of other past medical problems and general health maintenance,  He is urged to follow up with PCP.    Spent 30 minutes with patient face-to-face discussing care plan, interrogating and programming device.  An additional 3 minutes was spent reviewing patient chart.  Total care time spent 33 minutes.    Please note portions of this record was created using voice recognition software. I have made every reasonable attempt to correct obvious errors, but I expect that there are errors of grammar and possibly content I did not discover before finalizing the note.

## 2022-11-04 ENCOUNTER — PATIENT MESSAGE (OUTPATIENT)
Dept: HEALTH INFORMATION MANAGEMENT | Facility: OTHER | Age: 67
End: 2022-11-04

## 2023-01-10 ENCOUNTER — OFFICE VISIT (OUTPATIENT)
Dept: SLEEP MEDICINE | Facility: MEDICAL CENTER | Age: 68
End: 2023-01-10
Payer: MEDICARE

## 2023-01-10 VITALS
RESPIRATION RATE: 14 BRPM | HEART RATE: 89 BPM | DIASTOLIC BLOOD PRESSURE: 74 MMHG | HEIGHT: 70 IN | BODY MASS INDEX: 35.93 KG/M2 | OXYGEN SATURATION: 95 % | WEIGHT: 251 LBS | SYSTOLIC BLOOD PRESSURE: 128 MMHG

## 2023-01-10 DIAGNOSIS — Z45.42 ENCOUNTER FOR ADJUSTMENT AND MANAGEMENT OF NEUROSTIMULATOR: ICD-10-CM

## 2023-01-10 DIAGNOSIS — G47.31 COMPLEX SLEEP APNEA SYNDROME: Primary | ICD-10-CM

## 2023-01-10 PROCEDURE — 99213 OFFICE O/P EST LOW 20 MIN: CPT | Mod: 25 | Performed by: STUDENT IN AN ORGANIZED HEALTH CARE EDUCATION/TRAINING PROGRAM

## 2023-01-10 PROCEDURE — 95976 ALYS SMPL CN NPGT PRGRMG: CPT | Performed by: STUDENT IN AN ORGANIZED HEALTH CARE EDUCATION/TRAINING PROGRAM

## 2023-01-10 NOTE — PROGRESS NOTES
[facility] Follow-up Visit    CC: Follow-up regarding management of sleep apnea      HPI:  Gonzalo Mao Jr. is a 67 y.o.male  with hyperlipidemia, BPH, and severe complex sleep apnea on ASV and status post hypoglossal nerve stimulator.  He presents to sleep clinic to follow-up regarding management of obstructive sleep apnea.    He states since last visit he has increased his voltage on his hypoglossal nerve stimulator.  He has not noticed major benefit in terms of his sleep.  He feels that he slept better with using his ASV machine.  His bed partner also reports that he snores in his sleep with using the device.  He is concerned that the device may not be working as well as it could be.  He is curious to know ways to test efficacy of device.  States he is currently transitioning back and forth between using the inspire device alone or using his ASV machine.    Has been having difficulty using his ASV machine due to humidifier no longer working.  States he would like a new ASV machine in order to continue to use this while his inspire device is adjusted.    DME provider: [facility]  Device: ResMed ASV  Mask: Full  Aerophagia: No   Snoring: No   Dry mouth: Yes  Leak: No   Skin irritation: No   Chin strap: No     Programming and interrogation of hypoglossal nerve stimulator  His inspire device was interrogated and programmed at today's visit.  Stimulation was administered at incoming voltage of 1.5 V.  There was slight protrusion of tongue with a slight left deviation.  Increasing voltage and occurrence of 0.1 V up to 1.9 showed increased protrusion.  He did report increasing voltage was comfortable however as we increased to around 1.9 he felt that it was very strong.    Incoming settings:  1)Start Delay: 20 min  2)Pause Time: 15 min  3)Total Sleep Time: 8 hours  4)Pt Range(can change by 0.1):  0.4 v to 1.6 V  5)Pulse configuration (+-+)  6) voltage 1.5    Outgoing settings (changes in bold)  1)Start  Delay: 20 min  2)Pause Time: 15 min  3)Total Sleep Time: 8 hours  4)Pt Range(can change by 0.1):  1.5v to 2.3 V  5)Pulse configuration (+-+)  6) Voltage 1.7v    Sleep History  Split night study on 7/18/21 which showed Severe obstructive sleep apnea with AHI of 97.8/hr and O2 liz 67 %. Due to severity of the disease he met the split study protocol. The titration started with CPAP 5 cm and the ending pressure was ResMed ASVAuto at EPAP 4/10, PS 3/10 cm. The AHI improved to 67.32/hr with improved O2 liz of 80% and average O2 saturation of 90 %. He spent 36 % of sleep time below 88% O2 saturation    There are no problems to display for this patient.      Past Medical History:   Diagnosis Date    Arthritis 07/12/2022    knees, back    Heart burn 08/30/2022    occasional    High cholesterol     Indigestion     Sleep apnea 07/12/2022    +GLENN +CPAP    Snoring         Past Surgical History:   Procedure Laterality Date    [place] OPEN IMPLTJ HPGLSL NRV NSTIM RA PG&RESPIR SENSOR Right 9/19/2022    Procedure: INSERTION OF HYPOGLOSSAL NERVE NEUROSTIMULATOR ELECTRODE AND GENERATOR AND BREATHING SENSOR ELECTRODE RIGHT.;  Surgeon: Aguilar Raza M.D.;  Location: SURGERY SAME DAY [facility];  Service: Ent    TN DISE DYN EVAL SLEEP DISORDERED BREATHING FLX DX N/A 7/18/2022    Procedure: DRUG INDUCED SLEEP ENDOSCOPY;  Surgeon: Aguilar Raza M.D.;  Location: SURGERY SAME DAY [facility];  Service: Ent    OTHER  06/2022    epidural blocks    KNEE ARTHROSCOPY Right 01/2011       Family History   Problem Relation Age of Onset    Sleep Apnea Neg Hx        Social History     Socioeconomic History    Marital status: Single     Spouse name: Not on file    Number of children: Not on file    Years of education: Not on file    Highest education level: Not on file   Occupational History    Not on file   Tobacco Use    Smoking status: Never    Smokeless tobacco: Never   Vaping Use    Vaping Use: Never used   Substance and Sexual Activity    Alcohol  "use: Not Currently    Drug use: Not Currently    Sexual activity: Not on file   Other Topics Concern    Not on file   Social History Narrative    Not on file     Social Determinants of Health     Financial Resource Strain: Not on file   Food Insecurity: Not on file   Transportation Needs: Not on file   Physical Activity: Not on file   Stress: Not on file   Social Connections: Not on file   Intimate Partner Violence: Not on file   Housing Stability: Not on file       Current Outpatient Medications   Medication Sig Dispense Refill    HYDROcodone-acetaminophen (NORCO) 5-325 MG Tab per tablet Take 1 Tablet by mouth 2 times a day for 21 days. 42 Tablet 0    Multiple Vitamin (MULTIVITAMIN PO) Take  by mouth.      GABAPENTIN PO Take  by mouth.      atorvastatin (LIPITOR) 10 MG Tab atorvastatin 10 mg tablet      azaTHIOprine (IMURAN) 50 MG Tab TAKE 3 TABLETS BY MOUTH EVERY DAY AS DIRECTED      sulfaSALAzine (AZULFIDINE) 500 MG Tab TAKE 2 TABLETS BY MOUTH TWICE DAILY AS DIRECTED      tamsulosin (FLOMAX) 0.4 MG capsule Take 0.4 mg by mouth every day.       No current facility-administered medications for this visit.        ALLERGIES: Patient has no known allergies.    ROS  Constitutional: Denies fevers, Denies weight changes  Ears/Nose/Throat/Mouth: Denies nasal congestion or sore throat   Cardiovascular: Denies chest pain  Respiratory: Denies shortness of breath, Denies cough  Gastrointestinal/Hepatic: Denies nausea, vomiting  Sleep: see HPI      PHYSICAL EXAM  /74 (BP Location: Left arm, Patient Position: Sitting, BP Cuff Size: Large adult)   Pulse 89   Resp 14   Ht 1.778 m (5' 10\")   Wt 114 kg (251 lb)   SpO2 95%   BMI 36.01 kg/m²   Appearance: Well-nourished, well-developed, no acute distress  Eyes:  No scleral icterus , EOMI  ENMT: masked  Musculoskeletal:  Grossly normal; gait and station normal; digits and nails normal  Skin:  No rashes, petechiae, cyanosis  Neurologic: without focal signs; oriented to " person, time, place, and purpose; judgement intact      Medical Decision Making   Assessment and Plan  Gonzalo Mao Jr. is a 67 y.o.male  with hyperlipidemia, BPH, and severe complex sleep apnea on ASV and status post hypoglossal nerve stimulator.  He presents to sleep clinic to follow-up regarding management of obstructive sleep apnea.    The medical record was reviewed.    Obstructive sleep apnea  Patient reports alternating use of inspire device and ASV machine.  He still finds the ASV machine gives him a better night sleep.  He is having difficulty using his ASV machine due to his [place] fire being broken on his current machine.  States he would like to try to get a new machine from [facility].  Pt continues to use and benefit from machine.     He is feeling that his inspire device is not working to the level where it is controlling his sleep apnea.  He is hopeful that adjustments can be made to improve this.  He also presented concerned about repeating a sleep study to assess for efficacy of his inspire device.    Inspire device was interrogated and programmed at today's visit    PLAN:   -Order placed for new ASV machine EPAP 5 pressure support 3-10  -Encouraged to continue to use inspire device  -Voltage increased at today's visit as well as patient control range  -Advised to reach out via MyChart with questions     Patients with GLENN are at increased risk of cardiovascular disease including coronary artery disease, systemic arterial hypertension, pulmonary arterial hypertension, cardiac arrythmias, and stroke. The patient was advised to avoid driving a motor vehicle when drowsy.    Positive airway pressure will favorably impact many of the adverse conditions and effects provoked by GLENN.    Have advised the patient to follow up with the appropriate healthcare practitioners for all other medical problems and issues.    Return in about 8 weeks (around 3/7/2023).      Please note portions of this record was  created using voice recognition software. I have made every reasonable attempt to correct obvious errors, but I expect that there are errors of grammar and possibly content I did not discover before finalizing the note.

## 2023-01-18 ENCOUNTER — HOSPITAL ENCOUNTER (OUTPATIENT)
Dept: RADIOLOGY | Facility: MEDICAL CENTER | Age: 68
End: 2023-01-18
Attending: OTOLARYNGOLOGY
Payer: MEDICARE

## 2023-01-18 DIAGNOSIS — H91.23 SUDDEN HEARING LOSS OF BOTH EARS: ICD-10-CM

## 2023-01-18 PROCEDURE — 70480 CT ORBIT/EAR/FOSSA W/O DYE: CPT

## 2023-02-27 PROBLEM — M48.061 SPINAL STENOSIS OF LUMBAR REGION WITH RADICULOPATHY: Status: ACTIVE | Noted: 2023-02-27

## 2023-02-27 PROBLEM — M54.16 SPINAL STENOSIS OF LUMBAR REGION WITH RADICULOPATHY: Status: ACTIVE | Noted: 2023-02-27

## 2023-03-02 ENCOUNTER — OFFICE VISIT (OUTPATIENT)
Dept: SLEEP MEDICINE | Facility: MEDICAL CENTER | Age: 68
End: 2023-03-02
Attending: STUDENT IN AN ORGANIZED HEALTH CARE EDUCATION/TRAINING PROGRAM
Payer: MEDICARE

## 2023-03-02 VITALS
DIASTOLIC BLOOD PRESSURE: 76 MMHG | HEART RATE: 90 BPM | SYSTOLIC BLOOD PRESSURE: 144 MMHG | HEIGHT: 70 IN | BODY MASS INDEX: 35.79 KG/M2 | OXYGEN SATURATION: 94 % | WEIGHT: 250 LBS | RESPIRATION RATE: 16 BRPM

## 2023-03-02 DIAGNOSIS — G47.33 OSA (OBSTRUCTIVE SLEEP APNEA): Primary | ICD-10-CM

## 2023-03-02 DIAGNOSIS — Z96.82 S/P INSERTION OF HYPOGLOSSAL NERVE STIMULATOR: ICD-10-CM

## 2023-03-02 DIAGNOSIS — Z45.42 ENCOUNTER FOR ADJUSTMENT AND MANAGEMENT OF NEUROSTIMULATOR: ICD-10-CM

## 2023-03-02 PROCEDURE — 95976 ALYS SMPL CN NPGT PRGRMG: CPT | Performed by: STUDENT IN AN ORGANIZED HEALTH CARE EDUCATION/TRAINING PROGRAM

## 2023-03-02 PROCEDURE — 99213 OFFICE O/P EST LOW 20 MIN: CPT | Performed by: STUDENT IN AN ORGANIZED HEALTH CARE EDUCATION/TRAINING PROGRAM

## 2023-03-02 PROCEDURE — 99212 OFFICE O/P EST SF 10 MIN: CPT | Performed by: STUDENT IN AN ORGANIZED HEALTH CARE EDUCATION/TRAINING PROGRAM

## 2023-03-02 NOTE — PROGRESS NOTES
[facility] Follow-up Visit    CC: Follow-up regarding management of obstructive sleep apnea      HPI:  Gonzalo Mao Jr. is a 67 y.o.male  with hyperlipidemia, BPH and severe complex sleep apnea status post hypoglossal nerve stimulator.  Presents to sleep clinic to follow-up regarding management of obstructive sleep apnea.    He states that since last visit he has transition to using his hypoglossal nerve stimulator only.  He states that he has noticed benefit with using his device.  His bed partner is reported that his snoring has lessened with the device.  He does feel his sleep is improved with that but not to the same level as his ASV machine.  He states that his energy level is still somewhat decreased and he is tired at times during the day.  He denies drowsiness while driving.  He is curious as to what levels his inspire device needs to be in order to be therapeutic.  He is hopeful to get some hard data to rely off of in the future.  Since last visit he believes he did increase his voltage.    Programming and interrogation of hypoglossal nerve stimulator  Stimulation was delivered at incoming 1.6 V on current configuration.  Tongue protrusion with rightward deviation was noted.  Voltages were increased and 0.1 increments up to 1.8 V.  Patient found intolerable.  With increasing voltage protrusion increased.    Incoming Settings   1)Start Delay: 20 min  2)Pause Time: 15 min  3)Total Sleep Time: 8 hours  4)Voltage Range min 1.5   5Voltage Range max: 2.3  6)Pulse configuration (+-+)  7) Voltage 1.6v    Incoming Settings   1)Start Delay: 20 min  2)Pause Time: 15 min  3)Total Sleep Time: 8 hours  4)Voltage Range min 1.6   5Voltage Range max: 2.3  6)Pulse configuration (+-+)  7) Voltage 1.8v    Sleep History  Split night study on 7/18/21 which showed Severe obstructive sleep apnea with AHI of 97.8/hr and O2 liz 67 %. Due to severity of the disease he met the split study protocol. The titration  started with CPAP 5 cm and the ending pressure was ResMed ASVAuto at EPAP 4/10, PS 3/10 cm. The AHI improved to 67.32/hr with improved O2 liz of 80% and average O2 saturation of 90 %. He spent 36 % of sleep time below 88% O2 saturation    Patient Active Problem List    Diagnosis Date Noted    Spinal stenosis of lumbar region with radiculopathy 02/27/2023       Past Medical History:   Diagnosis Date    Arthritis 07/12/2022    knees, back    Heart burn 08/30/2022    occasional    High cholesterol     Indigestion     Sleep apnea 07/12/2022    +GLENN +CPAP    Snoring         Past Surgical History:   Procedure Laterality Date    UT OPEN IMPLTJ HPGLSL NRV NSTIM RA PG&RESPIR SENSOR Right 9/19/2022    Procedure: INSERTION OF HYPOGLOSSAL NERVE NEUROSTIMULATOR ELECTRODE AND GENERATOR AND BREATHING SENSOR ELECTRODE RIGHT.;  Surgeon: Aguilar Raza M.D.;  Location: SURGERY SAME DAY [facility];  Service: Ent    UT DISE DYN EVAL SLEEP DISORDERED BREATHING FLX DX N/A 7/18/2022    Procedure: DRUG INDUCED SLEEP ENDOSCOPY;  Surgeon: Aguilar Raza M.D.;  Location: SURGERY SAME DAY [facility];  Service: Ent    OTHER  06/2022    epidural blocks    KNEE ARTHROSCOPY Right 01/2011       Family History   Problem Relation Age of Onset    Sleep Apnea Neg Hx        Social History     Socioeconomic History    Marital status: Single     Spouse name: Not on file    Number of children: Not on file    Years of education: Not on file    Highest education level: Not on file   Occupational History    Not on file   Tobacco Use    Smoking status: Never    Smokeless tobacco: Never   Vaping Use    Vaping Use: Never used   Substance and Sexual Activity    Alcohol use: Not Currently    Drug use: Not Currently    Sexual activity: Not on file   Other Topics Concern    Not on file   Social History Narrative    Not on file     Social Determinants of Health     Financial Resource Strain: Not on file   Food Insecurity: Not on file   Transportation Needs: Not on  "file   Physical Activity: Not on file   Stress: Not on file   Social Connections: Not on file   Intimate Partner Violence: Not on file   Housing Stability: Not on file       Current Outpatient Medications   Medication Sig Dispense Refill    HYDROcodone-acetaminophen (NORCO) 5-325 MG Tab per tablet Take 1 Tablet by mouth 2 times a day for 21 days. 42 Tablet 0    HYDROcodone-acetaminophen (NORCO) 5-325 MG Tab per tablet Take 1 Tablet by mouth 2 times a day for 21 days. 42 Tablet 0    Multiple Vitamin (MULTIVITAMIN PO) Take  by mouth.      GABAPENTIN PO Take  by mouth.      atorvastatin (LIPITOR) 10 MG Tab atorvastatin 10 mg tablet      azaTHIOprine (IMURAN) 50 MG Tab TAKE 3 TABLETS BY MOUTH EVERY DAY AS DIRECTED      sulfaSALAzine (AZULFIDINE) 500 MG Tab TAKE 2 TABLETS BY MOUTH TWICE DAILY AS DIRECTED      tamsulosin (FLOMAX) 0.4 MG capsule Take 0.4 mg by mouth every day.       No current facility-administered medications for this visit.        ALLERGIES: Patient has no known allergies.    ROS  Constitutional: Denies fevers, Denies weight changes  Ears/Nose/Throat/Mouth: Denies nasal congestion or sore throat   Cardiovascular: Denies chest pain  Respiratory: Denies shortness of breath, Denies cough  Gastrointestinal/Hepatic: Denies nausea, vomiting  Sleep: see HPI      PHYSICAL EXAM  BP (!) 144/76 (BP Location: Left arm, Patient Position: Sitting, BP Cuff Size: Large adult)   Pulse 90   Resp 16   Ht 1.778 m (5' 10\")   Wt 113 kg (250 lb)   SpO2 94%   BMI 35.87 kg/m²   Appearance: Well-nourished, well-developed, no acute distress  Eyes:  No scleral icterus , EOMI  ENMT: masked  Musculoskeletal:  Grossly normal; gait and station normal; digits and nails normal  Skin:  No rashes, petechiae, cyanosis  Neurologic: without focal signs; oriented to person, time, place, and purpose; judgement intact      Medical Decision Making   Assessment and Plan  Gonzalo Mao . is a 67 y.o.male  with hyperlipidemia, BPH " and severe complex sleep apnea status post hypoglossal nerve stimulator.  Presents to sleep clinic to follow-up regarding management of obstructive sleep apnea.    The medical record was reviewed.    Obstructive sleep apnea  Patient is noticing benefit from hypoglossal nerve stimulator.  Since last visit he has decreased his voltage setting by 1 setting.  Encouraged to continue to increase his voltage setting by 1 setting every week or 2.  Advised that he is progressing and potentially becoming more therapeutic as time goes on.  Increase voltage at today's visit and change parameters with minimum setting.  Advised to continue to increase voltage setting.  Recommended at next visit we will plan to schedule a PSG inspire fine-tune study    PLAN:   -Continue to use inspire device nightly  -May increase by 1 voltage setting every 1 to 2 weeks as tolerable  -If continues to do well we will plan to order PSG fine-tune study at next visit  -Advised to reach out via MyChart with questions     Patients with GLENN are at increased risk of cardiovascular disease including coronary artery disease, systemic arterial hypertension, pulmonary arterial hypertension, cardiac arrythmias, and stroke. The patient was advised to avoid driving a motor vehicle when drowsy.    Positive airway pressure will favorably impact many of the adverse conditions and effects provoked by GLENN.    Have advised the patient to follow up with the appropriate healthcare practitioners for all other medical problems and issues.    Return in about 8 weeks (around 4/27/2023).      Please note portions of this record was created using voice recognition software. I have made every reasonable attempt to correct obvious errors, but I expect that there are errors of grammar and possibly content I did not discover before finalizing the note.

## 2023-03-03 ENCOUNTER — APPOINTMENT (OUTPATIENT)
Dept: ADMISSIONS | Facility: MEDICAL CENTER | Age: 68
End: 2023-03-03
Payer: MEDICARE

## 2023-03-06 ENCOUNTER — HOSPITAL ENCOUNTER (OUTPATIENT)
Facility: MEDICAL CENTER | Age: 68
End: 2023-03-06
Attending: NEUROLOGICAL SURGERY | Admitting: NEUROLOGICAL SURGERY
Payer: MEDICARE

## 2023-03-14 ENCOUNTER — HOSPITAL ENCOUNTER (OUTPATIENT)
Dept: RADIOLOGY | Facility: MEDICAL CENTER | Age: 68
End: 2023-03-14
Attending: NEUROLOGICAL SURGERY
Payer: MEDICARE

## 2023-03-14 DIAGNOSIS — M41.9 SCOLIOSIS, UNSPECIFIED SCOLIOSIS TYPE, UNSPECIFIED SPINAL REGION: ICD-10-CM

## 2023-03-14 DIAGNOSIS — M48.062 SPINAL STENOSIS, LUMBAR REGION, WITH NEUROGENIC CLAUDICATION: ICD-10-CM

## 2023-03-14 PROCEDURE — 72131 CT LUMBAR SPINE W/O DYE: CPT

## 2023-03-16 ENCOUNTER — HOSPITAL ENCOUNTER (OUTPATIENT)
Dept: RADIOLOGY | Facility: MEDICAL CENTER | Age: 68
End: 2023-03-16
Attending: FAMILY MEDICINE
Payer: MEDICARE

## 2023-03-16 DIAGNOSIS — R94.31 ABNORMAL ELECTROCARDIOGRAM: ICD-10-CM

## 2023-03-16 PROCEDURE — 700111 HCHG RX REV CODE 636 W/ 250 OVERRIDE (IP): Performed by: FAMILY MEDICINE

## 2023-03-16 PROCEDURE — 93018 CV STRESS TEST I&R ONLY: CPT | Performed by: INTERNAL MEDICINE

## 2023-03-16 PROCEDURE — 78452 HT MUSCLE IMAGE SPECT MULT: CPT | Mod: 26 | Performed by: INTERNAL MEDICINE

## 2023-03-16 PROCEDURE — 78452 HT MUSCLE IMAGE SPECT MULT: CPT

## 2023-03-16 RX ORDER — REGADENOSON 0.08 MG/ML
0.4 INJECTION, SOLUTION INTRAVENOUS ONCE
Status: COMPLETED | OUTPATIENT
Start: 2023-03-16 | End: 2023-03-16

## 2023-03-16 RX ORDER — AMINOPHYLLINE 25 MG/ML
100 INJECTION, SOLUTION INTRAVENOUS
Status: DISCONTINUED | OUTPATIENT
Start: 2023-03-16 | End: 2023-03-17 | Stop reason: HOSPADM

## 2023-03-16 RX ADMIN — REGADENOSON 0.4 MG: 0.08 INJECTION, SOLUTION INTRAVENOUS at 10:02

## 2023-03-17 ENCOUNTER — APPOINTMENT (OUTPATIENT)
Dept: ADMISSIONS | Facility: MEDICAL CENTER | Age: 68
End: 2023-03-17
Attending: NEUROLOGICAL SURGERY
Payer: MEDICARE

## 2023-03-20 ENCOUNTER — PRE-ADMISSION TESTING (OUTPATIENT)
Dept: ADMISSIONS | Facility: MEDICAL CENTER | Age: 68
End: 2023-03-20
Attending: NEUROLOGICAL SURGERY
Payer: MEDICARE

## 2023-03-20 RX ORDER — MULTIVIT-MIN/IRON/FOLIC ACID/K 18-600-40
1 CAPSULE ORAL DAILY
Status: ON HOLD | COMMUNITY
End: 2023-07-10

## 2023-03-20 RX ORDER — TRIAMTERENE AND HYDROCHLOROTHIAZIDE 37.5; 25 MG/1; MG/1
1 CAPSULE ORAL EVERY MORNING
Status: ON HOLD | COMMUNITY
End: 2023-07-10

## 2023-03-20 RX ORDER — FERROUS SULFATE 325(65) MG
325 TABLET ORAL DAILY
COMMUNITY
End: 2023-05-24

## 2023-03-20 RX ORDER — FOLIC ACID 0.8 MG
1 TABLET ORAL DAILY
Status: ON HOLD | COMMUNITY
End: 2023-07-10

## 2023-04-25 ENCOUNTER — OFFICE VISIT (OUTPATIENT)
Dept: SLEEP MEDICINE | Facility: MEDICAL CENTER | Age: 68
End: 2023-04-25
Attending: STUDENT IN AN ORGANIZED HEALTH CARE EDUCATION/TRAINING PROGRAM
Payer: MEDICARE

## 2023-04-25 VITALS
RESPIRATION RATE: 16 BRPM | DIASTOLIC BLOOD PRESSURE: 68 MMHG | OXYGEN SATURATION: 94 % | SYSTOLIC BLOOD PRESSURE: 132 MMHG | BODY MASS INDEX: 36.51 KG/M2 | HEIGHT: 70 IN | WEIGHT: 255 LBS | HEART RATE: 99 BPM

## 2023-04-25 DIAGNOSIS — Z96.82 S/P INSERTION OF HYPOGLOSSAL NERVE STIMULATOR: ICD-10-CM

## 2023-04-25 DIAGNOSIS — G47.33 OSA (OBSTRUCTIVE SLEEP APNEA): Primary | ICD-10-CM

## 2023-04-25 DIAGNOSIS — Z45.42 ENCOUNTER FOR ADJUSTMENT AND MANAGEMENT OF NEUROSTIMULATOR: ICD-10-CM

## 2023-04-25 PROCEDURE — 99212 OFFICE O/P EST SF 10 MIN: CPT | Performed by: STUDENT IN AN ORGANIZED HEALTH CARE EDUCATION/TRAINING PROGRAM

## 2023-04-25 PROCEDURE — 95976 ALYS SMPL CN NPGT PRGRMG: CPT | Performed by: STUDENT IN AN ORGANIZED HEALTH CARE EDUCATION/TRAINING PROGRAM

## 2023-04-25 PROCEDURE — 99213 OFFICE O/P EST LOW 20 MIN: CPT | Mod: 25 | Performed by: STUDENT IN AN ORGANIZED HEALTH CARE EDUCATION/TRAINING PROGRAM

## 2023-04-25 NOTE — PROGRESS NOTES
[facility] Follow-up Visit    CC: Follow-up regarding management of obstructive sleep apnea      HPI:  Gonzalo Mao Jr. is a 67 y.o.male  with BPH, hyperlipidemia, and severe obstructive sleep apnea status post hypoglossal nerve stimulator.  Presents today to discuss management of obstructive sleep apnea.    He reports since last visit things have deteriorated.  He feels that his device does not work as well.  He increased the stimulation.  He is currently using his ASV machine due to having lower energy level during the day as well as being sleepy.  He states that his drowsiness while driving has returned which has concerned him.  Therefore he has stopped using his inspire device and has been using his ASV machine.    He has been told that his snoring has returned.  This is different from last visit where he was doing relatively well.    Programming and interrogation of hypoglossal nerve stimulator  Stimulation was delivered to incoming setting of 2.3 V.  Tongue movement was protrusion with slight leftward deviation.  At this voltage tongue motion was fairly rapid.  Decreased voltage to 1.8 V.  At 1.8 V tongue protrusion was noted to a similar degree and less rapid.  Patient did feel this is more comfortable.    Incoming settings  1)Start Delay: 20 min  2)Pause Time: 15 min  3)Total run time: 8 hours  4) Minimum voltage 1.6 v   5) Maximum voltage 2.3v  6) Pulse configuration (+-+)  7) Current voltage 2.3v    Outgoing settings (changes in bold)  1)Start Delay: 20 min  2)Pause Time: 15 min  3)Total run time: 8 hours  4) Minimum voltage 1.6 v   5) Maximum voltage 1.9v  6) Pulse configuration (+-+)  7) Current voltage 1.7v    Sleep History  Split night study on 7/18/21 which showed Severe obstructive sleep apnea with AHI of 97.8/hr and O2 liz 67 %. Due to severity of the disease he met the split study protocol. The titration started with CPAP 5 cm and the ending pressure was ResMed ASVAuto at EPA  4/10, PS 3/10 cm. The AHI improved to 67.32/hr with improved O2 liz of 80% and average O2 saturation of 90 %. He spent 36 % of sleep time below 88% O2 saturation    Patient Active Problem List    Diagnosis Date Noted    Spinal stenosis of lumbar region with radiculopathy 02/27/2023       Past Medical History:   Diagnosis Date    Arthritis 07/12/2022    knees, back    Heart burn 08/30/2022    occasional    High cholesterol     Hypertension     controlled on meds    Indigestion     Sleep apnea 07/12/2022    +GLENN +CPAP    Snoring         Past Surgical History:   Procedure Laterality Date    TN OPEN IMPLTJ HPGLSL NRV NSTIM RA PG&RESPIR SENSOR Right 9/19/2022    Procedure: INSERTION OF HYPOGLOSSAL NERVE NEUROSTIMULATOR ELECTRODE AND GENERATOR AND BREATHING SENSOR ELECTRODE RIGHT.;  Surgeon: Aguilar Raza M.D.;  Location: SURGERY SAME DAY [facility];  Service: Ent    TN DISE DYN EVAL SLEEP DISORDERED BREATHING FLX DX N/A 7/18/2022    Procedure: DRUG INDUCED SLEEP ENDOSCOPY;  Surgeon: Aguilar Raza M.D.;  Location: SURGERY SAME DAY [facility];  Service: Ent    OTHER  06/2022    epidural blocks    KNEE ARTHROSCOPY Right 01/2011       Family History   Problem Relation Age of Onset    Sleep Apnea Neg Hx        Social History     Socioeconomic History    Marital status: Single     Spouse name: Not on file    Number of children: Not on file    Years of education: Not on file    Highest education level: Not on file   Occupational History    Not on file   Tobacco Use    Smoking status: Never    Smokeless tobacco: Never   Vaping Use    Vaping Use: Never used   Substance and Sexual Activity    Alcohol use: Not Currently    Drug use: Not Currently    Sexual activity: Not on file   Other Topics Concern    Not on file   Social History Narrative    Not on file     Social Determinants of Health     Financial Resource Strain: Not on file   Food Insecurity: Not on file   Transportation Needs: Not on file   Physical Activity: Not on file  "  Stress: Not on file   Social Connections: Not on file   Intimate Partner Violence: Not on file   Housing Stability: Not on file       Current Outpatient Medications   Medication Sig Dispense Refill    HYDROcodone-acetaminophen (NORCO) 5-325 MG Tab per tablet Take 1 Tablet by mouth 2 times a day for 21 days. 42 Tablet 0    Zinc 50 MG Cap Take 50 mg by mouth every day.      Magnesium 500 MG Cap Take 1 Capsule by mouth every day.      Ascorbic Acid (VITAMIN C) 500 MG Cap Take 1 Capsule by mouth every day.      B Complex-C (SUPER B COMPLEX PO) Take 1 Capsule by mouth every day.      ferrous sulfate 325 (65 Fe) MG tablet Take 325 mg by mouth every day.      triamterene/hctz (MAXZIDE-25/DYAZIDE) 37.5-25 MG Cap Take 1 Capsule by mouth every morning.      Multiple Vitamin (MULTIVITAMIN PO) Take  by mouth.      atorvastatin (LIPITOR) 10 MG Tab atorvastatin 10 mg tablet      azaTHIOprine (IMURAN) 50 MG Tab       sulfaSALAzine (AZULFIDINE) 500 MG Tab       tamsulosin (FLOMAX) 0.4 MG capsule Take 0.4 mg by mouth at bedtime.       No current facility-administered medications for this visit.        ALLERGIES: Patient has no known allergies.    ROS  Constitutional: Denies fevers, Denies weight changes  Ears/Nose/Throat/Mouth: Denies nasal congestion or sore throat   Cardiovascular: Denies chest pain  Respiratory: Denies shortness of breath, Denies cough  Gastrointestinal/Hepatic: Denies nausea, vomiting  Sleep: see HPI      PHYSICAL EXAM  /68 (BP Location: Left arm, Patient Position: Sitting, BP Cuff Size: Large adult)   Pulse 99   Resp 16   Ht 1.778 m (5' 10\")   Wt 116 kg (255 lb)   SpO2 94%   BMI 36.59 kg/m²   Appearance: Well-nourished, well-developed, no acute distress  Eyes:  No scleral icterus , EOMI  Musculoskeletal:  Grossly normal; gait and station normal; digits and nails normal  Skin:  No rashes, petechiae, cyanosis  Neurologic: without focal signs; oriented to person, time, place, and purpose; judgement " intact      Medical Decision Making   Assessment and Plan  Gonzalo Mao Jr. is a 67 y.o.male  with BPH, hyperlipidemia, and severe obstructive sleep apnea status post hypoglossal nerve stimulator.  Presents today to discuss management of obstructive sleep apnea.    The medical record was reviewed.    Obstructive sleep apnea  He is having difficulty with noticing benefit from his inspire device recently.  Seem to be doing better at last visit.  There is concern regarding over titration of inspire device.  Decreased voltage at today's visit back to previous settings.  Patient was noticing benefit at 1.6.  And at last visit increased to 1.8.  He was set to 1.7 V at today's visit.    Advised that he may continue to wear his ASV machine due to his excessive sleepiness.  Suspect that with the new voltage setting he will be able to tolerate inspire device and potentially notice more subjective benefit.  At this point would benefit from undergoing a PSG titration study to explore voltage settings.    Patient has questions about positional therapy.  Recommended he continue to use positional therapy with inspire device to make it more effective.    PLAN:   -Use hypoglossal nerve stimulator nightly  -May use ASV machine if he feels it is necessary with device  -Order placed for PSG inspire fine-tune study  -Advised to stay at this voltage did not increase  -Advised to reach out via MyChart with questions     Patients with GLENN are at increased risk of cardiovascular disease including coronary artery disease, systemic arterial hypertension, pulmonary arterial hypertension, cardiac arrythmias, and stroke. The patient was advised to avoid driving a motor vehicle when drowsy.    Have advised the patient to follow up with the appropriate healthcare practitioners for all other medical problems and issues.    Return for after sleep study.      Please note portions of this record was created using voice recognition software.  I have made every reasonable attempt to correct obvious errors, but I expect that there are errors of grammar and possibly content I did not discover before finalizing the note.

## 2023-05-03 ENCOUNTER — SLEEP STUDY (OUTPATIENT)
Dept: SLEEP MEDICINE | Facility: MEDICAL CENTER | Age: 68
End: 2023-05-03
Attending: STUDENT IN AN ORGANIZED HEALTH CARE EDUCATION/TRAINING PROGRAM
Payer: MEDICARE

## 2023-05-03 DIAGNOSIS — G47.33 OSA (OBSTRUCTIVE SLEEP APNEA): ICD-10-CM

## 2023-05-03 DIAGNOSIS — Z96.82 S/P INSERTION OF HYPOGLOSSAL NERVE STIMULATOR: ICD-10-CM

## 2023-05-03 DIAGNOSIS — Z45.42 ENCOUNTER FOR ADJUSTMENT AND MANAGEMENT OF NEUROSTIMULATOR: ICD-10-CM

## 2023-05-03 PROCEDURE — 95810 POLYSOM 6/> YRS 4/> PARAM: CPT | Performed by: STUDENT IN AN ORGANIZED HEALTH CARE EDUCATION/TRAINING PROGRAM

## 2023-05-04 NOTE — PROCEDURES
MONTAGE: Standard  STUDY TYPE: Titration  RECORDING TECHNIQUE:   After the scalp was prepared, gold plated electrodes were applied to the scalp according to the International 10-20 System. EEG (electroencephalogram) was continuously monitored from the O1-M2, O2-M1, C3-M2, C4-M1, F3-M2, and F4-M1. EOGs (electrooculograms) were monitored by electrodes placed at the left and right outer canthi. Chin EMG (electromyogram) was monitored by electrodes placed on the mentalis and sub-mentalis muscles. Nasal and oral airflow were monitored using a triple port thermocouple as well as oronasal pressure transducer. Respiratory effort was measured by inductive plethysmography technology employing abdominal and thoracic belts. Blood oxygen saturation and pulse were monitored by pulse oximetry. Heart rhythm was monitored by surface electrocardiogram. Leg EMG was studied using surface electrodes placed on left and right anterior tibialis. A microphone was used to monitor tracheal sounds and snoring. Body position was monitored and documented by technician observation.   SCORING CRITERIA:   A modification of the AASM manual for scoring of sleep and associated events was used. Obstructive apneas were scored by cessation of airflow for at least 10 seconds with continuing respiratory effort. Central apneas were scored by cessation of airflow for at least 10 seconds with no respiratory effort. Hypopneas were scored by a 30% or more reduction in airflow for at least 10 seconds accompanied by arterial oxygen desaturation of 3% or an arousal. For CMS (Medicare) patients, per AASM rule 1B, hypopneas are scored by 30% with mild reduction in airflow for at least 10 seconds accompanied by arterial saturation decreased at 4%.  Study start time was 10:29:38 PM. Diagnostic recording time was 7h 42.0m with a total sleep time of 6h 25.5m resulting in a sleep efficiency of 83.44%%. Sleep latency from the start of the study was 12 minutes and the  latency from sleep to REM was 331 minutes. In total,22 arousals were scored for an arousal index of 3.4.  Respiratory:  There were a total of 56 apneas consisting of 50 obstructive apneas, 0 mixed apneas, and 6 central apneas. A total of 225 hypopneas were scored. The apnea index was 8.72 per hour and the hypopnea index was 35.02 per hour resulting in an overall AHI of 43.74. AHI during REM was 26.3 and AHI while supine was 45.88.  Nonsupine AHI 43.5  Oximetry:  There was a mean oxygen saturation of 91.0%. The minimum oxygen saturation in NREM was 79.0 % and in REM was 82.0%. The patient spent 88.4 minutes of TST with SaO2 <88%.  Cardiac:  The highest heart rate seen while awake was 101 BPM while the highest heart rate during sleep was 88 BPM with an average sleeping heart rate of 60 BPM.  Limb Movements:  There were a total of 120 PLMs during sleep which resulted in a PLMS index of 18.7. Of these, 15 were associated with arousals which resulted in a PLMS arousal index of 2.3.  Titration: Inspire Stimulation was tried from 1.5 volts to 2.2 volts.   This was a fully attended sleep study. This test was technically adequate. This patient was titrated on Inspire device (hypoglossal nerve stimulator) starting at a voltage of  1.5v. Patient was titrated up to 2.2 v.  The patient did best at 2.2v. Patient spent 47 minutes at this voltage and the AHI was 17.7 which is considered Mild obstructive sleep apnea.       Impression:  1.  Titration study done with inspire device  2.  Obstructive sleep apnea better controlled in lateral position  3.  Supine REM sleep was seen during night of study  4.  Appeared to do best with a voltage amplitude of 2.2  5.  Nocturnal hypoxia likely due to sleep apnea minimal oxygen saturation 79%, time at or below 88% saturation 88.4 minutes  6.  Earlier in the night patient was on 1.7 V which had some improvement in sleep apnea as well with an overall AHI of 18.2  Recommendations:  I recommend the  patient increase voltage 2.2 V.  In addition to the voltage increase would recommend positional therapy with hypoglossal nerve stimulator.    Clinical correlation is required. In general patients with sleep apnea are advised to avoid alcohol, sedatives and not to operate a motor vehicle while drowsy.  Untreated sleep apnea increases the risk for cardiovascular and neurovascular disease.

## 2023-05-10 ENCOUNTER — APPOINTMENT (OUTPATIENT)
Dept: ADMISSIONS | Facility: MEDICAL CENTER | Age: 68
DRG: 454 | End: 2023-05-10
Attending: NEUROLOGICAL SURGERY
Payer: MEDICARE

## 2023-05-18 ENCOUNTER — APPOINTMENT (OUTPATIENT)
Dept: SLEEP MEDICINE | Facility: MEDICAL CENTER | Age: 68
End: 2023-05-18
Attending: PREVENTIVE MEDICINE
Payer: MEDICARE

## 2023-05-23 ENCOUNTER — OFFICE VISIT (OUTPATIENT)
Dept: SLEEP MEDICINE | Facility: MEDICAL CENTER | Age: 68
End: 2023-05-23
Attending: STUDENT IN AN ORGANIZED HEALTH CARE EDUCATION/TRAINING PROGRAM
Payer: MEDICARE

## 2023-05-23 VITALS
RESPIRATION RATE: 16 BRPM | HEIGHT: 70 IN | WEIGHT: 230 LBS | SYSTOLIC BLOOD PRESSURE: 134 MMHG | BODY MASS INDEX: 32.93 KG/M2 | DIASTOLIC BLOOD PRESSURE: 74 MMHG | HEART RATE: 99 BPM | OXYGEN SATURATION: 95 %

## 2023-05-23 DIAGNOSIS — Z96.82 S/P INSERTION OF HYPOGLOSSAL NERVE STIMULATOR: ICD-10-CM

## 2023-05-23 DIAGNOSIS — G47.33 OSA (OBSTRUCTIVE SLEEP APNEA): ICD-10-CM

## 2023-05-23 DIAGNOSIS — Z45.42 ENCOUNTER FOR ADJUSTMENT AND MANAGEMENT OF NEUROSTIMULATOR: ICD-10-CM

## 2023-05-23 PROCEDURE — 95971 ALYS SMPL SP/PN NPGT W/PRGRM: CPT | Performed by: STUDENT IN AN ORGANIZED HEALTH CARE EDUCATION/TRAINING PROGRAM

## 2023-05-23 PROCEDURE — 3075F SYST BP GE 130 - 139MM HG: CPT | Performed by: STUDENT IN AN ORGANIZED HEALTH CARE EDUCATION/TRAINING PROGRAM

## 2023-05-23 PROCEDURE — 99212 OFFICE O/P EST SF 10 MIN: CPT | Performed by: STUDENT IN AN ORGANIZED HEALTH CARE EDUCATION/TRAINING PROGRAM

## 2023-05-23 PROCEDURE — 99213 OFFICE O/P EST LOW 20 MIN: CPT | Mod: 25 | Performed by: STUDENT IN AN ORGANIZED HEALTH CARE EDUCATION/TRAINING PROGRAM

## 2023-05-23 PROCEDURE — 95976 ALYS SMPL CN NPGT PRGRMG: CPT | Performed by: STUDENT IN AN ORGANIZED HEALTH CARE EDUCATION/TRAINING PROGRAM

## 2023-05-23 PROCEDURE — 3078F DIAST BP <80 MM HG: CPT | Performed by: STUDENT IN AN ORGANIZED HEALTH CARE EDUCATION/TRAINING PROGRAM

## 2023-05-23 NOTE — PROGRESS NOTES
[facility] Follow-up Visit    CC: Follow-up regarding management of obstructive sleep apnea and to discuss sleep study results      HPI:  Gonzalo Mao Jr. is a 67 y.o.male  with hyperlipidemia, BPH, and severe obstructive sleep apnea status post hypoglossal nerve stimulator.  Presents today to discuss management of obstructive sleep apnea as well as recent sleep study results.    Reports night sleep study was okay.  He did review some of the study results prior to today's visit.  He states his sleep is improved since last visit.  He is noticing that he has more energy during the day and he is not as sleepy.  He is using his device regularly.  He states he has not had a bed partner since last visit and is unaware if his snoring has improved.  He states overall he is happy with his current device.  He has increased the level since last visit.    Programming and interrogation of hypoglossal nerve stimulator  Patient is complaining of possibly being too short.  In addition he would like his range changed to match that of his sleep study.    Incoming settings  1)Start Delay: 20 min  2)Pause Time: 15 min  3)Total run time: 8 hours  4) Minimum voltage 1.6 v   5) Maximum voltage 2.5v  6) Pulse configuration (+-+)  7) Current voltage 2.2v    Outgoing settings (changes in bold)  1)Start Delay: 20 min  2)Pause Time: 20 min  3)Total run time: 8 hours  4) Minimum voltage 1.7 v   5) Maximum voltage 2.6v  6) Pulse configuration (+-+)  7) Current voltage 2.2v    Sleep History  Split night study on 7/18/21 which showed Severe obstructive sleep apnea with AHI of 97.8/hr and O2 liz 67 %. Due to severity of the disease he met the split study protocol. The titration started with CPAP 5 cm and the ending pressure was ResMed ASVAuto at EPAP 4/10, PS 3/10 cm. The AHI improved to 67.32/hr with improved O2 liz of 80% and average O2 saturation of 90 %. He spent 36 % of sleep time below 88% O2 saturation       Patient  Active Problem List    Diagnosis Date Noted    Spinal stenosis of lumbar region with radiculopathy 02/27/2023       Past Medical History:   Diagnosis Date    Arthritis 07/12/2022    knees, back    Heart burn 08/30/2022    occasional    High cholesterol     Hypertension     controlled on meds    Indigestion     Sleep apnea 07/12/2022    +GLENN +CPAP    Snoring         Past Surgical History:   Procedure Laterality Date    HI OPEN IMPLTJ HPGLSL NRV NSTIM RA PG&RESPIR SENSOR Right 9/19/2022    Procedure: INSERTION OF HYPOGLOSSAL NERVE NEUROSTIMULATOR ELECTRODE AND GENERATOR AND BREATHING SENSOR ELECTRODE RIGHT.;  Surgeon: Aguilar Raza M.D.;  Location: SURGERY SAME DAY [facility];  Service: Ent    HI DISE DYN EVAL SLEEP DISORDERED BREATHING FLX DX N/A 7/18/2022    Procedure: DRUG INDUCED SLEEP ENDOSCOPY;  Surgeon: Aguilar Raza M.D.;  Location: SURGERY SAME DAY [facility];  Service: Ent    OTHER  06/2022    epidural blocks    KNEE ARTHROSCOPY Right 01/2011       Family History   Problem Relation Age of Onset    Sleep Apnea Neg Hx        Social History     Socioeconomic History    Marital status: Single     Spouse name: Not on file    Number of children: Not on file    Years of education: Not on file    Highest education level: Not on file   Occupational History    Not on file   Tobacco Use    Smoking status: Never    Smokeless tobacco: Never   Vaping Use    Vaping Use: Never used   Substance and Sexual Activity    Alcohol use: Not Currently    Drug use: Not Currently    Sexual activity: Not on file   Other Topics Concern    Not on file   Social History Narrative    Not on file     Social Determinants of Health     Financial Resource Strain: Not on file   Food Insecurity: Not on file   Transportation Needs: Not on file   Physical Activity: Not on file   Stress: Not on file   Social Connections: Not on file   Intimate Partner Violence: Not on file   Housing Stability: Not on file       Current Outpatient Medications  "  Medication Sig Dispense Refill    HYDROcodone-acetaminophen (NORCO) 5-325 MG Tab per tablet Take 1 Tablet by mouth 2 times a day for 11 days. 21 Tablet 0    methylPREDNISolone (MEDROL DOSEPAK) 4 MG Tablet Therapy Pack Follow schedule on package instructions. 1 Tablet 0    Zinc 50 MG Cap Take 50 mg by mouth every day.      Magnesium 500 MG Cap Take 1 Capsule by mouth every day.      Ascorbic Acid (VITAMIN C) 500 MG Cap Take 1 Capsule by mouth every day.      B Complex-C (SUPER B COMPLEX PO) Take 1 Capsule by mouth every day.      triamterene/hctz (MAXZIDE-25/DYAZIDE) 37.5-25 MG Cap Take 1 Capsule by mouth every morning.      Multiple Vitamin (MULTIVITAMIN PO) Take  by mouth.      atorvastatin (LIPITOR) 10 MG Tab atorvastatin 10 mg tablet      tamsulosin (FLOMAX) 0.4 MG capsule Take 0.4 mg by mouth at bedtime.       No current facility-administered medications for this visit.        ALLERGIES: Patient has no known allergies.    ROS  Constitutional: Denies fevers, Denies weight changes  Ears/Nose/Throat/Mouth: Denies nasal congestion or sore throat   Cardiovascular: Denies chest pain  Respiratory: Denies shortness of breath, Denies cough  Gastrointestinal/Hepatic: Denies nausea, vomiting  Sleep: see HPI      PHYSICAL EXAM  /74 (BP Location: Left arm, Patient Position: Sitting, BP Cuff Size: Large adult)   Pulse 99   Resp 16   Ht 1.778 m (5' 10\")   Wt 104 kg (230 lb)   SpO2 95%   BMI 33.00 kg/m²   Appearance: Well-nourished, well-developed, no acute distress  Eyes:  No scleral icterus , EOMI  Musculoskeletal:  Grossly normal; gait and station normal; digits and nails normal  Skin:  No rashes, petechiae, cyanosis  Neurologic: without focal signs; oriented to person, time, place, and purpose; judgement intact      Medical Decision Making   Assessment and Plan  Gonzalo Mao Jr. is a 67 y.o.male  with hyperlipidemia, BPH, and severe obstructive sleep apnea status post hypoglossal nerve stimulator.  " Presents today to discuss management of obstructive sleep apnea as well as recent sleep study results.    The medical record was reviewed.    Obstructive sleep apnea  Discussed recent sleep study results.  Advised that the study did indicate that at a voltage of 1.7 earlier in the night as well as 2.2 V at the end of the night his AHI was less than 20.  Discussed that given his baseline AHI of 98 that we have greater than 50% reduction in his overall events.  Advised that there may be more changes that he made to his device to try to get his AHI lower.  He reports that he would like to continue to work on his voltage settings.   His range was changed at today's visit as well as his pause delay.    PLAN:   -Continue to use hypoglossal nerve stimulator  -Advised would recommend staying at 2.2 V, however patient would like to adjust at home  -Advised to reach out via MyChart with questions       Patients with GLENN are at increased risk of cardiovascular disease including coronary artery disease, systemic arterial hypertension, pulmonary arterial hypertension, cardiac arrythmias, and stroke. The patient was advised to avoid driving a motor vehicle when drowsy.    Positive airway pressure will favorably impact many of the adverse conditions and effects provoked by GLENN.    Have advised the patient to follow up with the appropriate healthcare practitioners for all other medical problems and issues.    Return in about 4 months (around 9/23/2023).      Please note portions of this record was created using voice recognition software. I have made every reasonable attempt to correct obvious errors, but I expect that there are errors of grammar and possibly content I did not discover before finalizing the note.

## 2023-06-14 ENCOUNTER — PRE-ADMISSION TESTING (OUTPATIENT)
Dept: ADMISSIONS | Facility: MEDICAL CENTER | Age: 68
DRG: 454 | End: 2023-06-14
Attending: NEUROLOGICAL SURGERY
Payer: MEDICARE

## 2023-06-14 NOTE — OR NURSING
Dr. Cabrera's sent pt to have labs at [facility], and these are still pending on 6/14/23.  Labs were drawn today.  Call to [facility] for xrays, and they have no recent xrays results.  Fax to Dr Daley for copy of ECG tracing to be faxed to us.

## 2023-07-10 ENCOUNTER — ANESTHESIA EVENT (OUTPATIENT)
Dept: SURGERY | Facility: MEDICAL CENTER | Age: 68
DRG: 454 | End: 2023-07-10
Payer: MEDICARE

## 2023-07-10 ENCOUNTER — ANESTHESIA (OUTPATIENT)
Dept: SURGERY | Facility: MEDICAL CENTER | Age: 68
DRG: 454 | End: 2023-07-10
Payer: MEDICARE

## 2023-07-10 ENCOUNTER — APPOINTMENT (OUTPATIENT)
Dept: RADIOLOGY | Facility: MEDICAL CENTER | Age: 68
DRG: 454 | End: 2023-07-10
Attending: NEUROLOGICAL SURGERY
Payer: MEDICARE

## 2023-07-10 ENCOUNTER — HOSPITAL ENCOUNTER (INPATIENT)
Facility: MEDICAL CENTER | Age: 68
LOS: 4 days | DRG: 454 | End: 2023-07-14
Attending: NEUROLOGICAL SURGERY | Admitting: NEUROLOGICAL SURGERY
Payer: MEDICARE

## 2023-07-10 DIAGNOSIS — M48.061 SPINAL STENOSIS OF LUMBAR REGION WITH RADICULOPATHY: ICD-10-CM

## 2023-07-10 DIAGNOSIS — M48.062 LUMBAR STENOSIS WITH NEUROGENIC CLAUDICATION: ICD-10-CM

## 2023-07-10 DIAGNOSIS — M54.16 SPINAL STENOSIS OF LUMBAR REGION WITH RADICULOPATHY: ICD-10-CM

## 2023-07-10 LAB
ABO + RH BLD: NORMAL
ABO GROUP BLD: NORMAL
BLD GP AB SCN SERPL QL: NORMAL
RH BLD: NORMAL

## 2023-07-10 PROCEDURE — 700102 HCHG RX REV CODE 250 W/ 637 OVERRIDE(OP): Performed by: ANESTHESIOLOGY

## 2023-07-10 PROCEDURE — 86850 RBC ANTIBODY SCREEN: CPT

## 2023-07-10 PROCEDURE — 700111 HCHG RX REV CODE 636 W/ 250 OVERRIDE (IP): Mod: JZ | Performed by: ANESTHESIOLOGY

## 2023-07-10 PROCEDURE — 700105 HCHG RX REV CODE 258: Mod: JZ | Performed by: ANESTHESIOLOGY

## 2023-07-10 PROCEDURE — 700111 HCHG RX REV CODE 636 W/ 250 OVERRIDE (IP): Performed by: ANESTHESIOLOGY

## 2023-07-10 PROCEDURE — 160031 HCHG SURGERY MINUTES - 1ST 30 MINS LEVEL 5: Performed by: NEUROLOGICAL SURGERY

## 2023-07-10 PROCEDURE — 770001 HCHG ROOM/CARE - MED/SURG/GYN PRIV*

## 2023-07-10 PROCEDURE — 0ST20ZZ RESECTION OF LUMBAR VERTEBRAL DISC, OPEN APPROACH: ICD-10-PCS | Performed by: NEUROLOGICAL SURGERY

## 2023-07-10 PROCEDURE — 160035 HCHG PACU - 1ST 60 MINS PHASE I: Performed by: NEUROLOGICAL SURGERY

## 2023-07-10 PROCEDURE — A9270 NON-COVERED ITEM OR SERVICE: HCPCS | Performed by: ANESTHESIOLOGY

## 2023-07-10 PROCEDURE — 700111 HCHG RX REV CODE 636 W/ 250 OVERRIDE (IP): Performed by: NEUROLOGICAL SURGERY

## 2023-07-10 PROCEDURE — 110371 HCHG SHELL REV 272: Performed by: NEUROLOGICAL SURGERY

## 2023-07-10 PROCEDURE — C1821 INTERSPINOUS IMPLANT: HCPCS | Performed by: NEUROLOGICAL SURGERY

## 2023-07-10 PROCEDURE — 86900 BLOOD TYPING SEROLOGIC ABO: CPT

## 2023-07-10 PROCEDURE — 0SG1071 FUSION OF 2 OR MORE LUMBAR VERTEBRAL JOINTS WITH AUTOLOGOUS TISSUE SUBSTITUTE, POSTERIOR APPROACH, POSTERIOR COLUMN, OPEN APPROACH: ICD-10-PCS | Performed by: NEUROLOGICAL SURGERY

## 2023-07-10 PROCEDURE — 700105 HCHG RX REV CODE 258: Performed by: ANESTHESIOLOGY

## 2023-07-10 PROCEDURE — 3E0U0GB INTRODUCTION OF RECOMBINANT BONE MORPHOGENETIC PROTEIN INTO JOINTS, OPEN APPROACH: ICD-10-PCS | Performed by: NEUROLOGICAL SURGERY

## 2023-07-10 PROCEDURE — 160036 HCHG PACU - EA ADDL 30 MINS PHASE I: Performed by: NEUROLOGICAL SURGERY

## 2023-07-10 PROCEDURE — 86901 BLOOD TYPING SEROLOGIC RH(D): CPT

## 2023-07-10 PROCEDURE — 00670 ANES XTNSV SP&SPI CORD PX: CPT | Performed by: ANESTHESIOLOGY

## 2023-07-10 PROCEDURE — C1713 ANCHOR/SCREW BN/BN,TIS/BN: HCPCS | Performed by: NEUROLOGICAL SURGERY

## 2023-07-10 PROCEDURE — 160002 HCHG RECOVERY MINUTES (STAT): Performed by: NEUROLOGICAL SURGERY

## 2023-07-10 PROCEDURE — A9270 NON-COVERED ITEM OR SERVICE: HCPCS | Performed by: NEUROLOGICAL SURGERY

## 2023-07-10 PROCEDURE — 160009 HCHG ANES TIME/MIN: Performed by: NEUROLOGICAL SURGERY

## 2023-07-10 PROCEDURE — 502714 HCHG ROBOTIC SURGERY SERVICES: Performed by: NEUROLOGICAL SURGERY

## 2023-07-10 PROCEDURE — 36415 COLL VENOUS BLD VENIPUNCTURE: CPT

## 2023-07-10 PROCEDURE — 700102 HCHG RX REV CODE 250 W/ 637 OVERRIDE(OP): Performed by: NEUROLOGICAL SURGERY

## 2023-07-10 PROCEDURE — 700101 HCHG RX REV CODE 250: Performed by: ANESTHESIOLOGY

## 2023-07-10 PROCEDURE — 700105 HCHG RX REV CODE 258: Performed by: NEUROLOGICAL SURGERY

## 2023-07-10 PROCEDURE — 0SG107J FUSION OF 2 OR MORE LUMBAR VERTEBRAL JOINTS WITH AUTOLOGOUS TISSUE SUBSTITUTE, POSTERIOR APPROACH, ANTERIOR COLUMN, OPEN APPROACH: ICD-10-PCS | Performed by: NEUROLOGICAL SURGERY

## 2023-07-10 PROCEDURE — 700101 HCHG RX REV CODE 250: Performed by: NEUROLOGICAL SURGERY

## 2023-07-10 PROCEDURE — 160042 HCHG SURGERY MINUTES - EA ADDL 1 MIN LEVEL 5: Performed by: NEUROLOGICAL SURGERY

## 2023-07-10 PROCEDURE — 72100 X-RAY EXAM L-S SPINE 2/3 VWS: CPT

## 2023-07-10 PROCEDURE — 01NB0ZZ RELEASE LUMBAR NERVE, OPEN APPROACH: ICD-10-PCS | Performed by: NEUROLOGICAL SURGERY

## 2023-07-10 PROCEDURE — 110454 HCHG SHELL REV 250: Performed by: NEUROLOGICAL SURGERY

## 2023-07-10 PROCEDURE — 8E0W3CZ ROBOTIC ASSISTED PROCEDURE OF TRUNK REGION, PERCUTANEOUS APPROACH: ICD-10-PCS | Performed by: NEUROLOGICAL SURGERY

## 2023-07-10 PROCEDURE — 0SG10AJ FUSION OF 2 OR MORE LUMBAR VERTEBRAL JOINTS WITH INTERBODY FUSION DEVICE, POSTERIOR APPROACH, ANTERIOR COLUMN, OPEN APPROACH: ICD-10-PCS | Performed by: NEUROLOGICAL SURGERY

## 2023-07-10 PROCEDURE — 502000 HCHG MISC OR IMPLANTS RC 0278: Performed by: NEUROLOGICAL SURGERY

## 2023-07-10 PROCEDURE — 160048 HCHG OR STATISTICAL LEVEL 1-5: Performed by: NEUROLOGICAL SURGERY

## 2023-07-10 DEVICE — IMPLANTABLE DEVICE: Type: IMPLANTABLE DEVICE | Site: SPINE LUMBAR | Status: FUNCTIONAL

## 2023-07-10 DEVICE — GRAFT BONE GEL GRAFTON 1CC (1EA): Type: IMPLANTABLE DEVICE | Site: SPINE LUMBAR | Status: FUNCTIONAL

## 2023-07-10 DEVICE — SCREW MAS  SOLERA 6.5 X 50MM (1TCX16+3TCX8=40): Type: IMPLANTABLE DEVICE | Site: SPINE LUMBAR | Status: FUNCTIONAL

## 2023-07-10 DEVICE — GRAPH BONE KIT INFUSE MEDIUM: Type: IMPLANTABLE DEVICE | Site: SPINE LUMBAR | Status: FUNCTIONAL

## 2023-07-10 DEVICE — GRAFT BONE MAGNIFUSE 1X10CM: Type: IMPLANTABLE DEVICE | Site: SPINE LUMBAR | Status: FUNCTIONAL

## 2023-07-10 DEVICE — CAGE SPINAL CATALYFT SPACER PL40 INTERBODY SHORT 24MM X 27MM X 7MM (1EA): Type: IMPLANTABLE DEVICE | Site: SPINE LUMBAR | Status: FUNCTIONAL

## 2023-07-10 DEVICE — SCREW SOLERA SET SCREW (1TCX40+3TCX21+2TCX10=123): Type: IMPLANTABLE DEVICE | Site: SPINE LUMBAR | Status: FUNCTIONAL

## 2023-07-10 RX ORDER — ONDANSETRON 2 MG/ML
4 INJECTION INTRAMUSCULAR; INTRAVENOUS
Status: DISCONTINUED | OUTPATIENT
Start: 2023-07-10 | End: 2023-07-10 | Stop reason: HOSPADM

## 2023-07-10 RX ORDER — HYDROMORPHONE HYDROCHLORIDE 1 MG/ML
0.2 INJECTION, SOLUTION INTRAMUSCULAR; INTRAVENOUS; SUBCUTANEOUS
Status: DISCONTINUED | OUTPATIENT
Start: 2023-07-10 | End: 2023-07-10 | Stop reason: HOSPADM

## 2023-07-10 RX ORDER — ATORVASTATIN CALCIUM 10 MG/1
10 TABLET, FILM COATED ORAL DAILY
Status: DISCONTINUED | OUTPATIENT
Start: 2023-07-10 | End: 2023-07-14 | Stop reason: HOSPADM

## 2023-07-10 RX ORDER — SODIUM CHLORIDE, SODIUM LACTATE, POTASSIUM CHLORIDE, CALCIUM CHLORIDE 600; 310; 30; 20 MG/100ML; MG/100ML; MG/100ML; MG/100ML
INJECTION, SOLUTION INTRAVENOUS CONTINUOUS
Status: ACTIVE | OUTPATIENT
Start: 2023-07-10 | End: 2023-07-10

## 2023-07-10 RX ORDER — POLYETHYLENE GLYCOL 3350 17 G/17G
1 POWDER, FOR SOLUTION ORAL 2 TIMES DAILY PRN
Status: DISCONTINUED | OUTPATIENT
Start: 2023-07-10 | End: 2023-07-14 | Stop reason: HOSPADM

## 2023-07-10 RX ORDER — CEFAZOLIN SODIUM 1 G/3ML
INJECTION, POWDER, FOR SOLUTION INTRAMUSCULAR; INTRAVENOUS
Status: DISCONTINUED | OUTPATIENT
Start: 2023-07-10 | End: 2023-07-10 | Stop reason: HOSPADM

## 2023-07-10 RX ORDER — CEFAZOLIN SODIUM 1 G/3ML
INJECTION, POWDER, FOR SOLUTION INTRAMUSCULAR; INTRAVENOUS PRN
Status: DISCONTINUED | OUTPATIENT
Start: 2023-07-10 | End: 2023-07-10 | Stop reason: SURG

## 2023-07-10 RX ORDER — BISACODYL 10 MG
10 SUPPOSITORY, RECTAL RECTAL
Status: DISCONTINUED | OUTPATIENT
Start: 2023-07-10 | End: 2023-07-14 | Stop reason: HOSPADM

## 2023-07-10 RX ORDER — OXYCODONE HCL 5 MG/5 ML
10 SOLUTION, ORAL ORAL
Status: COMPLETED | OUTPATIENT
Start: 2023-07-10 | End: 2023-07-10

## 2023-07-10 RX ORDER — DEXAMETHASONE SODIUM PHOSPHATE 4 MG/ML
INJECTION, SOLUTION INTRA-ARTICULAR; INTRALESIONAL; INTRAMUSCULAR; INTRAVENOUS; SOFT TISSUE PRN
Status: DISCONTINUED | OUTPATIENT
Start: 2023-07-10 | End: 2023-07-10 | Stop reason: SURG

## 2023-07-10 RX ORDER — HYDROCODONE BITARTRATE AND ACETAMINOPHEN 5; 325 MG/1; MG/1
1 TABLET ORAL EVERY 6 HOURS PRN
Status: ON HOLD | COMMUNITY
End: 2023-07-13

## 2023-07-10 RX ORDER — AMOXICILLIN 250 MG
1 CAPSULE ORAL
Status: DISCONTINUED | OUTPATIENT
Start: 2023-07-10 | End: 2023-07-14 | Stop reason: HOSPADM

## 2023-07-10 RX ORDER — MEPERIDINE HYDROCHLORIDE 25 MG/ML
25 INJECTION INTRAMUSCULAR; INTRAVENOUS; SUBCUTANEOUS
Status: DISCONTINUED | OUTPATIENT
Start: 2023-07-10 | End: 2023-07-10 | Stop reason: HOSPADM

## 2023-07-10 RX ORDER — ENEMA 19; 7 G/133ML; G/133ML
1 ENEMA RECTAL
Status: DISCONTINUED | OUTPATIENT
Start: 2023-07-10 | End: 2023-07-14 | Stop reason: HOSPADM

## 2023-07-10 RX ORDER — SODIUM CHLORIDE 9 MG/ML
INJECTION, SOLUTION INTRAVENOUS CONTINUOUS
Status: DISCONTINUED | OUTPATIENT
Start: 2023-07-10 | End: 2023-07-12

## 2023-07-10 RX ORDER — OXYCODONE HCL 5 MG/5 ML
5 SOLUTION, ORAL ORAL
Status: COMPLETED | OUTPATIENT
Start: 2023-07-10 | End: 2023-07-10

## 2023-07-10 RX ORDER — EPHEDRINE SULFATE 50 MG/ML
INJECTION, SOLUTION INTRAVENOUS PRN
Status: DISCONTINUED | OUTPATIENT
Start: 2023-07-10 | End: 2023-07-10 | Stop reason: SURG

## 2023-07-10 RX ORDER — TIZANIDINE 4 MG/1
2 TABLET ORAL 3 TIMES DAILY PRN
Status: DISCONTINUED | OUTPATIENT
Start: 2023-07-10 | End: 2023-07-14 | Stop reason: HOSPADM

## 2023-07-10 RX ORDER — BUPIVACAINE HYDROCHLORIDE AND EPINEPHRINE 5; 5 MG/ML; UG/ML
INJECTION, SOLUTION PERINEURAL
Status: DISCONTINUED | OUTPATIENT
Start: 2023-07-10 | End: 2023-07-10 | Stop reason: HOSPADM

## 2023-07-10 RX ORDER — LIDOCAINE HYDROCHLORIDE 20 MG/ML
INJECTION, SOLUTION EPIDURAL; INFILTRATION; INTRACAUDAL; PERINEURAL PRN
Status: DISCONTINUED | OUTPATIENT
Start: 2023-07-10 | End: 2023-07-10 | Stop reason: SURG

## 2023-07-10 RX ORDER — HYDRALAZINE HYDROCHLORIDE 20 MG/ML
10 INJECTION INTRAMUSCULAR; INTRAVENOUS
Status: DISCONTINUED | OUTPATIENT
Start: 2023-07-10 | End: 2023-07-14 | Stop reason: HOSPADM

## 2023-07-10 RX ORDER — ONDANSETRON 2 MG/ML
INJECTION INTRAMUSCULAR; INTRAVENOUS PRN
Status: DISCONTINUED | OUTPATIENT
Start: 2023-07-10 | End: 2023-07-10 | Stop reason: SURG

## 2023-07-10 RX ORDER — DOCUSATE SODIUM 100 MG/1
100 CAPSULE, LIQUID FILLED ORAL 2 TIMES DAILY
Status: DISCONTINUED | OUTPATIENT
Start: 2023-07-10 | End: 2023-07-14 | Stop reason: HOSPADM

## 2023-07-10 RX ORDER — HYDROMORPHONE HYDROCHLORIDE 1 MG/ML
0.5 INJECTION, SOLUTION INTRAMUSCULAR; INTRAVENOUS; SUBCUTANEOUS
Status: DISCONTINUED | OUTPATIENT
Start: 2023-07-10 | End: 2023-07-10 | Stop reason: HOSPADM

## 2023-07-10 RX ORDER — HYDROMORPHONE HYDROCHLORIDE 1 MG/ML
0.1 INJECTION, SOLUTION INTRAMUSCULAR; INTRAVENOUS; SUBCUTANEOUS
Status: DISCONTINUED | OUTPATIENT
Start: 2023-07-10 | End: 2023-07-10 | Stop reason: HOSPADM

## 2023-07-10 RX ORDER — AMOXICILLIN 250 MG
1 CAPSULE ORAL NIGHTLY
Status: DISCONTINUED | OUTPATIENT
Start: 2023-07-10 | End: 2023-07-14 | Stop reason: HOSPADM

## 2023-07-10 RX ORDER — MIDAZOLAM HYDROCHLORIDE 1 MG/ML
1 INJECTION INTRAMUSCULAR; INTRAVENOUS
Status: DISCONTINUED | OUTPATIENT
Start: 2023-07-10 | End: 2023-07-10 | Stop reason: HOSPADM

## 2023-07-10 RX ORDER — ONDANSETRON 2 MG/ML
4 INJECTION INTRAMUSCULAR; INTRAVENOUS EVERY 4 HOURS PRN
Status: DISCONTINUED | OUTPATIENT
Start: 2023-07-10 | End: 2023-07-14 | Stop reason: HOSPADM

## 2023-07-10 RX ORDER — ACETAMINOPHEN 500 MG
1000 TABLET ORAL EVERY 6 HOURS
Status: DISCONTINUED | OUTPATIENT
Start: 2023-07-11 | End: 2023-07-14 | Stop reason: HOSPADM

## 2023-07-10 RX ORDER — ONDANSETRON 4 MG/1
4 TABLET, ORALLY DISINTEGRATING ORAL EVERY 4 HOURS PRN
Status: DISCONTINUED | OUTPATIENT
Start: 2023-07-10 | End: 2023-07-14 | Stop reason: HOSPADM

## 2023-07-10 RX ORDER — ACETAMINOPHEN 500 MG
1000 TABLET ORAL EVERY 6 HOURS PRN
Status: DISCONTINUED | OUTPATIENT
Start: 2023-07-16 | End: 2023-07-14 | Stop reason: HOSPADM

## 2023-07-10 RX ORDER — DIPHENHYDRAMINE HYDROCHLORIDE 50 MG/ML
12.5 INJECTION INTRAMUSCULAR; INTRAVENOUS
Status: DISCONTINUED | OUTPATIENT
Start: 2023-07-10 | End: 2023-07-10 | Stop reason: HOSPADM

## 2023-07-10 RX ORDER — SODIUM CHLORIDE, SODIUM LACTATE, POTASSIUM CHLORIDE, CALCIUM CHLORIDE 600; 310; 30; 20 MG/100ML; MG/100ML; MG/100ML; MG/100ML
INJECTION, SOLUTION INTRAVENOUS CONTINUOUS
Status: DISCONTINUED | OUTPATIENT
Start: 2023-07-10 | End: 2023-07-10 | Stop reason: HOSPADM

## 2023-07-10 RX ORDER — PHENYLEPHRINE HCL IN 0.9% NACL 0.5 MG/5ML
SYRINGE (ML) INTRAVENOUS PRN
Status: DISCONTINUED | OUTPATIENT
Start: 2023-07-10 | End: 2023-07-10 | Stop reason: SURG

## 2023-07-10 RX ORDER — DIPHENHYDRAMINE HCL 25 MG
25 TABLET ORAL EVERY 6 HOURS PRN
Status: DISCONTINUED | OUTPATIENT
Start: 2023-07-10 | End: 2023-07-14 | Stop reason: HOSPADM

## 2023-07-10 RX ORDER — IPRATROPIUM BROMIDE AND ALBUTEROL SULFATE 2.5; .5 MG/3ML; MG/3ML
3 SOLUTION RESPIRATORY (INHALATION)
Status: DISCONTINUED | OUTPATIENT
Start: 2023-07-10 | End: 2023-07-10 | Stop reason: HOSPADM

## 2023-07-10 RX ORDER — SODIUM CHLORIDE, SODIUM LACTATE, POTASSIUM CHLORIDE, CALCIUM CHLORIDE 600; 310; 30; 20 MG/100ML; MG/100ML; MG/100ML; MG/100ML
INJECTION, SOLUTION INTRAVENOUS
Status: DISCONTINUED | OUTPATIENT
Start: 2023-07-10 | End: 2023-07-10 | Stop reason: SURG

## 2023-07-10 RX ORDER — DIPHENHYDRAMINE HYDROCHLORIDE 50 MG/ML
25 INJECTION INTRAMUSCULAR; INTRAVENOUS EVERY 6 HOURS PRN
Status: DISCONTINUED | OUTPATIENT
Start: 2023-07-10 | End: 2023-07-14 | Stop reason: HOSPADM

## 2023-07-10 RX ADMIN — ONDANSETRON 4 MG: 2 INJECTION INTRAMUSCULAR; INTRAVENOUS at 10:35

## 2023-07-10 RX ADMIN — HYDROMORPHONE HYDROCHLORIDE 0.5 MG: 1 INJECTION, SOLUTION INTRAMUSCULAR; INTRAVENOUS; SUBCUTANEOUS at 17:27

## 2023-07-10 RX ADMIN — OXYCODONE HYDROCHLORIDE 10 MG: 5 SOLUTION ORAL at 17:56

## 2023-07-10 RX ADMIN — CEFAZOLIN 2 G: 1 INJECTION, POWDER, FOR SOLUTION INTRAMUSCULAR; INTRAVENOUS at 14:03

## 2023-07-10 RX ADMIN — EPHEDRINE SULFATE 10 MG: 50 INJECTION, SOLUTION INTRAVENOUS at 11:31

## 2023-07-10 RX ADMIN — FENTANYL CITRATE 100 MCG: 50 INJECTION, SOLUTION INTRAMUSCULAR; INTRAVENOUS at 12:15

## 2023-07-10 RX ADMIN — Medication 200 MCG: at 11:27

## 2023-07-10 RX ADMIN — Medication: at 22:40

## 2023-07-10 RX ADMIN — EPHEDRINE SULFATE 15 MG: 50 INJECTION, SOLUTION INTRAVENOUS at 11:44

## 2023-07-10 RX ADMIN — FENTANYL CITRATE 50 MCG: 50 INJECTION, SOLUTION INTRAMUSCULAR; INTRAVENOUS at 15:42

## 2023-07-10 RX ADMIN — FENTANYL CITRATE 100 MCG: 50 INJECTION, SOLUTION INTRAMUSCULAR; INTRAVENOUS at 13:13

## 2023-07-10 RX ADMIN — FENTANYL CITRATE 100 MCG: 50 INJECTION, SOLUTION INTRAMUSCULAR; INTRAVENOUS at 14:16

## 2023-07-10 RX ADMIN — CEFAZOLIN 2 G: 2 INJECTION, POWDER, FOR SOLUTION INTRAMUSCULAR; INTRAVENOUS at 21:51

## 2023-07-10 RX ADMIN — PROPOFOL 200 MG: 10 INJECTION, EMULSION INTRAVENOUS at 10:14

## 2023-07-10 RX ADMIN — MIDAZOLAM 2 MG: 1 INJECTION, SOLUTION INTRAMUSCULAR; INTRAVENOUS at 15:39

## 2023-07-10 RX ADMIN — MIDAZOLAM 2 MG: 1 INJECTION, SOLUTION INTRAMUSCULAR; INTRAVENOUS at 10:10

## 2023-07-10 RX ADMIN — SUGAMMADEX 200 MG: 100 INJECTION, SOLUTION INTRAVENOUS at 16:15

## 2023-07-10 RX ADMIN — ATORVASTATIN CALCIUM 10 MG: 10 TABLET, FILM COATED ORAL at 21:46

## 2023-07-10 RX ADMIN — HYDROMORPHONE HYDROCHLORIDE 0.5 MG: 1 INJECTION, SOLUTION INTRAMUSCULAR; INTRAVENOUS; SUBCUTANEOUS at 17:20

## 2023-07-10 RX ADMIN — ROCURONIUM BROMIDE 100 MG: 10 INJECTION, SOLUTION INTRAVENOUS at 10:14

## 2023-07-10 RX ADMIN — SENNOSIDES AND DOCUSATE SODIUM 1 TABLET: 50; 8.6 TABLET ORAL at 21:46

## 2023-07-10 RX ADMIN — LIDOCAINE HYDROCHLORIDE 100 MG: 20 INJECTION, SOLUTION EPIDURAL; INFILTRATION; INTRACAUDAL at 10:14

## 2023-07-10 RX ADMIN — FENTANYL CITRATE 50 MCG: 50 INJECTION, SOLUTION INTRAMUSCULAR; INTRAVENOUS at 15:05

## 2023-07-10 RX ADMIN — FENTANYL CITRATE 100 MCG: 50 INJECTION, SOLUTION INTRAMUSCULAR; INTRAVENOUS at 13:46

## 2023-07-10 RX ADMIN — DEXAMETHASONE SODIUM PHOSPHATE 8 MG: 4 INJECTION INTRA-ARTICULAR; INTRALESIONAL; INTRAMUSCULAR; INTRAVENOUS; SOFT TISSUE at 10:35

## 2023-07-10 RX ADMIN — SODIUM CHLORIDE, POTASSIUM CHLORIDE, SODIUM LACTATE AND CALCIUM CHLORIDE: 600; 310; 30; 20 INJECTION, SOLUTION INTRAVENOUS at 10:10

## 2023-07-10 RX ADMIN — PROPOFOL 150 MCG/KG/MIN: 10 INJECTION, EMULSION INTRAVENOUS at 10:17

## 2023-07-10 RX ADMIN — CEFAZOLIN 2 G: 1 INJECTION, POWDER, FOR SOLUTION INTRAMUSCULAR; INTRAVENOUS at 10:10

## 2023-07-10 RX ADMIN — DOCUSATE SODIUM 100 MG: 100 CAPSULE, LIQUID FILLED ORAL at 21:46

## 2023-07-10 RX ADMIN — METHOCARBAMOL 1000 MG: 100 INJECTION, SOLUTION INTRAMUSCULAR; INTRAVENOUS at 18:36

## 2023-07-10 RX ADMIN — FENTANYL CITRATE 100 MCG: 50 INJECTION, SOLUTION INTRAMUSCULAR; INTRAVENOUS at 10:14

## 2023-07-10 RX ADMIN — FENTANYL CITRATE 100 MCG: 50 INJECTION, SOLUTION INTRAMUSCULAR; INTRAVENOUS at 10:45

## 2023-07-10 RX ADMIN — FENTANYL CITRATE 100 MCG: 50 INJECTION, SOLUTION INTRAMUSCULAR; INTRAVENOUS at 11:48

## 2023-07-10 RX ADMIN — SODIUM CHLORIDE: 9 INJECTION, SOLUTION INTRAVENOUS at 21:47

## 2023-07-10 RX ADMIN — FENTANYL CITRATE 100 MCG: 50 INJECTION, SOLUTION INTRAMUSCULAR; INTRAVENOUS at 12:48

## 2023-07-10 RX ADMIN — FENTANYL CITRATE 100 MCG: 50 INJECTION, SOLUTION INTRAMUSCULAR; INTRAVENOUS at 11:15

## 2023-07-10 ASSESSMENT — PAIN DESCRIPTION - PAIN TYPE
TYPE: ACUTE PAIN;SURGICAL PAIN

## 2023-07-10 ASSESSMENT — PAIN SCALES - GENERAL: PAIN_LEVEL: 3

## 2023-07-10 NOTE — ANESTHESIA PREPROCEDURE EVALUATION
Case: [id number] Date/Time: 07/10/23 1030    Procedure: L2-5 TRANSFORAMINAL LUMBAR INTERBODY FUSION WITH ROBOT (Spine Lumbar)    Anesthesia type: General    Pre-op diagnosis: SCOLIOSIS DEFORMITY OF SPINE    Location: [facility] OR 05 / SURGERY [facility]    Surgeons: Mauricio Larry M.D.          Relevant Problems   No relevant active problems       Physical Exam    Airway   Mallampati: II  TM distance: >3 FB  Neck ROM: full       Cardiovascular - normal exam  Rhythm: regular  Rate: normal  (-) murmur     Dental - normal exam           Pulmonary - normal exam  Breath sounds clear to auscultation     Abdominal    Neurological - normal exam                 Anesthesia Plan    ASA 3       Plan - general       Airway plan will be ETT          Induction: intravenous    Postoperative Plan: Postoperative administration of opioids is intended.    Pertinent diagnostic labs and testing reviewed    Informed Consent:    Anesthetic plan and risks discussed with patient.    Use of blood products discussed with: patient whom consented to blood products.

## 2023-07-10 NOTE — OR SURGEON
Immediate Post OP Note    PreOp Diagnosis: lumbar stenosis      PostOp Diagnosis: same      Procedure(s):  L2-5 TRANSFORAMINAL LUMBAR INTERBODY FUSION WITH ROBOT - Wound Class: Clean with Drain    Surgeon(s):  Mauricio Larry M.D.    Anesthesiologist/Type of Anesthesia:  Anesthesiologist: Ken Adrian M.D./General    Surgical Staff:  Assistant: EYAD Sparrow  Circulator: Sharri Garcia R.N.  Relief Circulator: Freda Vargas R.N.; Dafne Markham R.N.  Scrub Person: Tania Duncan  Radiology Technologist: Hayde Nance    Specimens removed if any:  * No specimens in log *    Estimated Blood Loss: 550 cc    Findings: good decompression    Complications: none        7/10/2023 4:12 PM EYAD Sparrow

## 2023-07-10 NOTE — PROGRESS NOTES
Med Rec complete per patient   Allergies reviewed  No oral antibiotics in the last 30 days    Patient states only taking Atorvastatin and Norco

## 2023-07-10 NOTE — ANESTHESIA PROCEDURE NOTES
Airway    Date/Time: 7/10/2023 10:16 AM    Performed by: Ken Adrian M.D.  Authorized by: Ken Adrian M.D.    Location:  OR  Urgency:  Elective  Indications for Airway Management:  Anesthesia      Spontaneous Ventilation: absent    Sedation Level:  Deep  Preoxygenated: Yes    Patient Position:  Sniffing  Final Airway Type:  Endotracheal airway  Final Endotracheal Airway:  ETT  Cuffed: Yes    Technique Used for Successful ETT Placement:  Direct laryngoscopy  Devices/Methods Used in Placement:  Anterior pressure/BURP and Bougie    Insertion Site:  Oral  Blade Type:  Lesley  Laryngoscope Blade/Videolaryngoscope Blade Size:  3  ETT Size (mm):  8.0  Measured from:  Teeth  ETT to Teeth (cm):  24  Placement Verified by: auscultation and capnometry    Cormack-Lehane Classification:  Grade IIb - view of arytenoids or posterior of glottis only  Number of Attempts at Approach:  1

## 2023-07-11 LAB
ANION GAP SERPL CALC-SCNC: 11 MMOL/L (ref 7–16)
BUN SERPL-MCNC: 13 MG/DL (ref 8–22)
CALCIUM SERPL-MCNC: 7.8 MG/DL (ref 8.5–10.5)
CHLORIDE SERPL-SCNC: 102 MMOL/L (ref 96–112)
CO2 SERPL-SCNC: 23 MMOL/L (ref 20–33)
CREAT SERPL-MCNC: 0.88 MG/DL (ref 0.5–1.4)
ERYTHROCYTE [DISTWIDTH] IN BLOOD BY AUTOMATED COUNT: 43.3 FL (ref 35.9–50)
GFR SERPLBLD CREATININE-BSD FMLA CKD-EPI: 93 ML/MIN/1.73 M 2
GLUCOSE SERPL-MCNC: 136 MG/DL (ref 65–99)
HCT VFR BLD AUTO: 32.4 % (ref 42–52)
HGB BLD-MCNC: 10.9 G/DL (ref 14–18)
MCH RBC QN AUTO: 30.2 PG (ref 27–33)
MCHC RBC AUTO-ENTMCNC: 33.6 G/DL (ref 32.3–36.5)
MCV RBC AUTO: 89.8 FL (ref 81.4–97.8)
PLATELET # BLD AUTO: 187 K/UL (ref 164–446)
PMV BLD AUTO: 10.5 FL (ref 9–12.9)
POTASSIUM SERPL-SCNC: 4.5 MMOL/L (ref 3.6–5.5)
RBC # BLD AUTO: 3.61 M/UL (ref 4.7–6.1)
SODIUM SERPL-SCNC: 136 MMOL/L (ref 135–145)
WBC # BLD AUTO: 13.5 K/UL (ref 4.8–10.8)

## 2023-07-11 PROCEDURE — 97166 OT EVAL MOD COMPLEX 45 MIN: CPT

## 2023-07-11 PROCEDURE — 97535 SELF CARE MNGMENT TRAINING: CPT

## 2023-07-11 PROCEDURE — 770001 HCHG ROOM/CARE - MED/SURG/GYN PRIV*

## 2023-07-11 PROCEDURE — A9270 NON-COVERED ITEM OR SERVICE: HCPCS | Performed by: NEUROLOGICAL SURGERY

## 2023-07-11 PROCEDURE — 97161 PT EVAL LOW COMPLEX 20 MIN: CPT

## 2023-07-11 PROCEDURE — 700105 HCHG RX REV CODE 258: Performed by: NEUROLOGICAL SURGERY

## 2023-07-11 PROCEDURE — 80048 BASIC METABOLIC PNL TOTAL CA: CPT

## 2023-07-11 PROCEDURE — A9270 NON-COVERED ITEM OR SERVICE: HCPCS | Performed by: CLINICAL NURSE SPECIALIST

## 2023-07-11 PROCEDURE — 36415 COLL VENOUS BLD VENIPUNCTURE: CPT

## 2023-07-11 PROCEDURE — 97530 THERAPEUTIC ACTIVITIES: CPT

## 2023-07-11 PROCEDURE — 85027 COMPLETE CBC AUTOMATED: CPT

## 2023-07-11 PROCEDURE — 700102 HCHG RX REV CODE 250 W/ 637 OVERRIDE(OP): Performed by: NEUROLOGICAL SURGERY

## 2023-07-11 PROCEDURE — 700102 HCHG RX REV CODE 250 W/ 637 OVERRIDE(OP): Performed by: CLINICAL NURSE SPECIALIST

## 2023-07-11 PROCEDURE — 700111 HCHG RX REV CODE 636 W/ 250 OVERRIDE (IP): Mod: JZ | Performed by: CLINICAL NURSE SPECIALIST

## 2023-07-11 PROCEDURE — 700111 HCHG RX REV CODE 636 W/ 250 OVERRIDE (IP): Performed by: NEUROLOGICAL SURGERY

## 2023-07-11 RX ORDER — LORATADINE 10 MG/1
10 TABLET ORAL DAILY
Status: DISCONTINUED | OUTPATIENT
Start: 2023-07-11 | End: 2023-07-14 | Stop reason: HOSPADM

## 2023-07-11 RX ORDER — OXYCODONE HYDROCHLORIDE 5 MG/1
5 TABLET ORAL EVERY 4 HOURS PRN
Status: DISCONTINUED | OUTPATIENT
Start: 2023-07-11 | End: 2023-07-14 | Stop reason: HOSPADM

## 2023-07-11 RX ORDER — OXYCODONE HYDROCHLORIDE 10 MG/1
10 TABLET ORAL EVERY 4 HOURS PRN
Status: DISCONTINUED | OUTPATIENT
Start: 2023-07-11 | End: 2023-07-14 | Stop reason: HOSPADM

## 2023-07-11 RX ORDER — MORPHINE SULFATE 4 MG/ML
2 INJECTION INTRAVENOUS
Status: DISCONTINUED | OUTPATIENT
Start: 2023-07-11 | End: 2023-07-14 | Stop reason: HOSPADM

## 2023-07-11 RX ADMIN — ATORVASTATIN CALCIUM 10 MG: 10 TABLET, FILM COATED ORAL at 05:22

## 2023-07-11 RX ADMIN — MORPHINE SULFATE 2 MG: 4 INJECTION, SOLUTION INTRAMUSCULAR; INTRAVENOUS at 20:24

## 2023-07-11 RX ADMIN — OXYCODONE HYDROCHLORIDE 10 MG: 10 TABLET ORAL at 20:24

## 2023-07-11 RX ADMIN — SENNOSIDES AND DOCUSATE SODIUM 1 TABLET: 50; 8.6 TABLET ORAL at 20:24

## 2023-07-11 RX ADMIN — TIZANIDINE 2 MG: 4 TABLET ORAL at 05:22

## 2023-07-11 RX ADMIN — OXYCODONE HYDROCHLORIDE 10 MG: 10 TABLET ORAL at 11:46

## 2023-07-11 RX ADMIN — DOCUSATE SODIUM 100 MG: 100 CAPSULE, LIQUID FILLED ORAL at 05:22

## 2023-07-11 RX ADMIN — LORATADINE 10 MG: 10 TABLET ORAL at 12:16

## 2023-07-11 RX ADMIN — OXYCODONE HYDROCHLORIDE 10 MG: 10 TABLET ORAL at 16:20

## 2023-07-11 RX ADMIN — CEFAZOLIN 2 G: 2 INJECTION, POWDER, FOR SOLUTION INTRAMUSCULAR; INTRAVENOUS at 05:24

## 2023-07-11 RX ADMIN — ACETAMINOPHEN 1000 MG: 500 TABLET, FILM COATED ORAL at 11:46

## 2023-07-11 RX ADMIN — DOCUSATE SODIUM 100 MG: 100 CAPSULE, LIQUID FILLED ORAL at 16:20

## 2023-07-11 RX ADMIN — ACETAMINOPHEN 1000 MG: 500 TABLET, FILM COATED ORAL at 05:22

## 2023-07-11 ASSESSMENT — COGNITIVE AND FUNCTIONAL STATUS - GENERAL
PERSONAL GROOMING: A LITTLE
DAILY ACTIVITIY SCORE: 18
HELP NEEDED FOR BATHING: A LITTLE
SUGGESTED CMS G CODE MODIFIER MOBILITY: CK
CLIMB 3 TO 5 STEPS WITH RAILING: A LITTLE
MOVING TO AND FROM BED TO CHAIR: A LITTLE
MOVING TO AND FROM BED TO CHAIR: A LITTLE
HELP NEEDED FOR BATHING: A LITTLE
WALKING IN HOSPITAL ROOM: A LITTLE
DAILY ACTIVITIY SCORE: 21
EATING MEALS: A LITTLE
TOILETING: A LITTLE
STANDING UP FROM CHAIR USING ARMS: A LITTLE
DRESSING REGULAR LOWER BODY CLOTHING: A LITTLE
TURNING FROM BACK TO SIDE WHILE IN FLAT BAD: A LITTLE
STANDING UP FROM CHAIR USING ARMS: A LITTLE
SUGGESTED CMS G CODE MODIFIER DAILY ACTIVITY: CK
SUGGESTED CMS G CODE MODIFIER DAILY ACTIVITY: CJ
MOBILITY SCORE: 18
DRESSING REGULAR UPPER BODY CLOTHING: A LITTLE
MOVING FROM LYING ON BACK TO SITTING ON SIDE OF FLAT BED: A LITTLE
DRESSING REGULAR LOWER BODY CLOTHING: A LITTLE
TOILETING: A LITTLE
WALKING IN HOSPITAL ROOM: A LITTLE
TURNING FROM BACK TO SIDE WHILE IN FLAT BAD: A LITTLE
MOVING FROM LYING ON BACK TO SITTING ON SIDE OF FLAT BED: A LITTLE
MOBILITY SCORE: 18
CLIMB 3 TO 5 STEPS WITH RAILING: A LITTLE
SUGGESTED CMS G CODE MODIFIER MOBILITY: CK

## 2023-07-11 ASSESSMENT — LIFESTYLE VARIABLES
TOTAL SCORE: 0
ALCOHOL_USE: NO
TOTAL SCORE: 0
TOTAL SCORE: 0
CONSUMPTION TOTAL: NEGATIVE
AVERAGE NUMBER OF DAYS PER WEEK YOU HAVE A DRINK CONTAINING ALCOHOL: 0
ON A TYPICAL DAY WHEN YOU DRINK ALCOHOL HOW MANY DRINKS DO YOU HAVE: 0
HOW MANY TIMES IN THE PAST YEAR HAVE YOU HAD 5 OR MORE DRINKS IN A DAY: 0
DOES PATIENT WANT TO STOP DRINKING: NO
HAVE YOU EVER FELT YOU SHOULD CUT DOWN ON YOUR DRINKING: NO
EVER FELT BAD OR GUILTY ABOUT YOUR DRINKING: NO
EVER HAD A DRINK FIRST THING IN THE MORNING TO STEADY YOUR NERVES TO GET RID OF A HANGOVER: NO
HAVE PEOPLE ANNOYED YOU BY CRITICIZING YOUR DRINKING: NO

## 2023-07-11 ASSESSMENT — PAIN DESCRIPTION - PAIN TYPE
TYPE: ACUTE PAIN;SURGICAL PAIN
TYPE: ACUTE PAIN;SURGICAL PAIN
TYPE: ACUTE PAIN
TYPE: ACUTE PAIN;SURGICAL PAIN

## 2023-07-11 ASSESSMENT — PATIENT HEALTH QUESTIONNAIRE - PHQ9
2. FEELING DOWN, DEPRESSED, IRRITABLE, OR HOPELESS: NOT AT ALL
SUM OF ALL RESPONSES TO PHQ9 QUESTIONS 1 AND 2: 0
1. LITTLE INTEREST OR PLEASURE IN DOING THINGS: NOT AT ALL

## 2023-07-11 ASSESSMENT — ACTIVITIES OF DAILY LIVING (ADL): TOILETING: INDEPENDENT

## 2023-07-11 ASSESSMENT — GAIT ASSESSMENTS
GAIT LEVEL OF ASSIST: STANDBY ASSIST
DISTANCE (FEET): 120
ASSISTIVE DEVICE: FRONT WHEEL WALKER
DEVIATION: ANTALGIC

## 2023-07-11 NOTE — DISCHARGE PLANNING
PM&R referral from Dr. Larry. Other ortho s/p L2-5 TLIF, Medicare provider. Potential son support. Therapy evaluations pending. Physiatry consult pended at this time.

## 2023-07-11 NOTE — OR NURSING
1737: Pt's son Aramis phoned and updated on pt status in Recovery and POC.   1832: pt's friend Shana phoned and updated on pt status in Recovery, POC and transfer to S176-1 when the room is ready.   1847: Aramis phoned and updated on pt status in Recovery and transfer to S176-1 when the room is ready.

## 2023-07-11 NOTE — THERAPY
Physical Therapy   Initial Evaluation     Patient Name: Gonzalo Mao Jr.  Age:  67 y.o., Sex:  male  Medical Record #: [id number]  Today's Date: 7/11/2023     Precautions  Precautions: (P) Fall Risk;Lumbosacral Orthosis;Spinal / Back Precautions   Comments: (P) LSO when OOB > 5 mins, off for bathroom and shower use    Assessment  Patient is 67 y.o. male who was seen s/p L2-L5 fusion. Pt has orders for spinal back precautions, log rolling, and LSO when OOB > 5 mins. Pt was agreeable to therapy evaluation and presented to PT with impaired balance, impaired gait, pain, and poor upright activity tolerance. Pt was primarily limited due to fatigue and intermittent pain with upright mobility. Pt currently requires SBA to CGA for all upright mobility with frequent VC and reminder to maintain spinal precautions. Pt has a slight antalgic gait during ambulation with slightly dec heel strike on R LE, however, functional for appropriate foot clearance. Pt will continue to benefit from skilled PT while in house, with recommendation for OP therapy services for optimal progression and assist from son upon d/c to home. Will plan to do stair assessment prior to d/c home.     PT treatment provided: Pt was provided with frequent VC, demonstration, and TC in order to maintain spinal precautions during bed mobility, sit<>stand transitions, and transfers. Pt provided with VC and demo in order to maintain log rolling mechanics during bed mobility. Pt provided with education on donning/doffing LSO in sitting/standing.     Plan    Physical Therapy Initial Treatment Plan   Treatment Plan : (P) Bed Mobility, Equipment, Gait Training, Neuro Re-Education / Balance, Self Care / Home Evaluation, Stair Training, Therapeutic Exercise, Therapeutic Activities  Treatment Frequency: (P) 5 Times per Week  Duration: (P) Until Therapy Goals Met    DC Equipment Recommendations: (P) Front-Wheel Walker  Discharge Recommendations: (P) Recommend  outpatient physical therapy services to address higher level deficits     Objective       07/11/23 0843   Initial Contact Note    Initial Contact Note Order Received and Verified, Physical Therapy Evaluation in Progress with Full Report to Follow.   Precautions   Precautions Fall Risk;Lumbosacral Orthosis;Spinal / Back Precautions    Comments LSO when OOB > 5 mins, off for bathroom and shower use   Pain 0 - 10 Group   Location Back   Location Orientation Lower;Posterior   Description Sore   Therapist Pain Assessment Post Activity;During Activity;Prior to Activity;Nurse Notified;2   Prior Living Situation   Prior Services None   Housing / Facility 1 Story House   Steps Into Home 2   Steps In Home 0   Equipment Owned Other (Comments)  (walking stick)   Lives with - Patient's Self Care Capacity Adult Children   Comments pt states son will be home to assist as needed   Prior Level of Functional Mobility   Bed Mobility Independent   Transfer Status Independent   Ambulation Independent   Ambulation Distance   (community)   Assistive Devices Used None   Stairs Independent   History of Falls   History of Falls No   Cognition    Cognition / Consciousness WDL   Level of Consciousness Alert   Comments pleasant/cooperative, verbose and easily distracted at times   Passive ROM Upper Body   Passive ROM Upper Body WDL   Active ROM Upper Body   Active ROM Upper Body  WDL   Strength Upper Body   Upper Body Strength  WDL   Sensation Upper Body   Upper Extremity Sensation  WDL   Upper Body Muscle Tone   Upper Body Muscle Tone  WDL   Passive ROM Lower Body   Passive ROM Lower Body WDL   Active ROM Lower Body    Active ROM Lower Body  WDL   Strength Lower Body   Lower Body Strength  X   Comments reports of slight weakness of R LE vs L LE, however, able to demo functional strength, not formally tested due to low back pain   Sensation Lower Body   Lower Extremity Sensation   X   Comments reports of dec sensation of R LE, however, able to  feel the floor during ambulation. States of tingling in R LE   Lower Body Muscle Tone   Lower Body Muscle Tone  WDL   Neurological Concerns   Neurological Concerns No   Coordination Upper Body   Coordination WDL   Coordination Lower Body    Coordination Lower Body  WDL   Other Treatments   Other Treatments Provided provided with VC and demo for maintain spinal precautions and perfroming log roll technique during bed mobility; provided with education on donning/doffing LSO in sitting and standing   Balance Assessment   Sitting Balance (Static) Fair +   Sitting Balance (Dynamic) Fair   Standing Balance (Static) Fair   Standing Balance (Dynamic) Fair   Weight Shift Sitting Fair   Weight Shift Standing Fair   Comments w/fww use, no gwendolyn Lob noted   Bed Mobility    Supine to Sit Contact Guard Assist   Scooting Standby Assist   Comments HOB slight elevated and use of railings in order to bring trunk to upright position   Gait Analysis   Gait Level Of Assist Standby Assist   Assistive Device Front Wheel Walker   Distance (Feet) 120   # of Times Distance was Traveled 1   Deviation Antalgic   Weight Bearing Status fwb   Functional Mobility   Sit to Stand Standby Assist   Bed, Chair, Wheelchair Transfer Contact Guard Assist   Transfer Method Stand Step   Mobility EOB, sit<>stand, ambulation, transfer to chair   How much difficulty does the patient currently have...   Turning over in bed (including adjusting bedclothes, sheets and blankets)? 3   Sitting down on and standing up from a chair with arms (e.g., wheelchair, bedside commode, etc.) 3   Moving from lying on back to sitting on the side of the bed? 3   How much help from another person does the patient currently need...   Moving to and from a bed to a chair (including a wheelchair)? 3   Need to walk in a hospital room? 3   Climbing 3-5 steps with a railing? 3   6 clicks Mobility Score 18   Activity Tolerance   Sitting in Chair left up in chair, RN aware   Sitting Edge  of Bed 10 mins   Standing 8 mins   Comments limited by pain   Edema / Skin Assessment   Edema / Skin  Not Assessed   Patient / Family Goals    Patient / Family Goal #1 to go home   Short Term Goals    Short Term Goal # 1 pt will go supine<>sit w/hob flat and rails down w/spv in 6tx via log rolling   Short Term Goal # 2 pt will go sit<>stand w/fww w/spv in 6tx   Short Term Goal # 3 pt will ambulate for 150ft w/fww w/spv in 6tx   Short Term Goal # 4 pt will go up/down 2 steps w/LRD w/spv in 6tx   Education Group   Education Provided Spine Precautions;Role of Physical Therapist;Use of Assistive Device;Brace Wear and Care   Spine Precautions Patient Response Patient;Acceptance;Explanation;Demonstration;Handout;Verbal Demonstration;Action Demonstration;Reinforcement Needed   Role of Physical Therapist Patient Response Patient;Acceptance;Explanation;Demonstration;Action Demonstration;Verbal Demonstration   Use of Assistive Device Patient Response Patient;Acceptance;Explanation;Demonstration;Verbal Demonstration;Action Demonstration   Brace Wear & Care Patient Response Patient;Acceptance;Explanation;Demonstration;Verbal Demonstration;Action Demonstration   Physical Therapy Initial Treatment Plan    Treatment Plan  Bed Mobility;Equipment;Gait Training;Neuro Re-Education / Balance;Self Care / Home Evaluation;Stair Training;Therapeutic Exercise;Therapeutic Activities   Treatment Frequency 5 Times per Week   Duration Until Therapy Goals Met   Problem List    Problems Pain;Impaired Bed Mobility;Impaired Transfers;Impaired Ambulation;Impaired Balance;Decreased Activity Tolerance;Limited Knowledge of Post-Op Precautions   Anticipated Discharge Equipment and Recommendations   DC Equipment Recommendations Front-Wheel Walker   Discharge Recommendations Recommend outpatient physical therapy services to address higher level deficits   Interdisciplinary Plan of Care Collaboration   IDT Collaboration with  Nursing   Patient Position at  End of Therapy Seated;Chair Alarm On;Call Light within Reach;Tray Table within Reach;Phone within Reach   Collaboration Comments aware of visit and recs   Session Information   Date / Session Number  7/11-1 (1/5, 7/17)   Priority   (anticipate 1-2 more session prior to d/c home)

## 2023-07-11 NOTE — OR NURSING
Pt on 4 L oxy-mask - breathing thru his mouth.  Baseline pt uses a CPAP at night and has an inspire implant in place.    No c/o nausea, tolerating PO fluids and medication.  Slow to wake up from anesthesia.  Lower back surgical site CDI.  Hemovac compressed, patent and draining serosanguinous fluid. Reynolds patent and draining. Surgical pain tolerable per pt, 3/10.  VSS, afebrile, MEMBRENO, A/O x4.     PCA and tubing on pt bed.   LSO, small blue tote and 1 clear pt belonging bag on bed.     Oxygen tank 100% full.     Bedside handoff to Racheal DAY.

## 2023-07-11 NOTE — CARE PLAN
The patient is Stable - Low risk of patient condition declining or worsening    Shift Goals  Clinical Goals: Pain control, increase mobilization, monitor drain outpt  Patient Goals: pain control, go home  Family Goals: MARLEEN    Progress made toward(s) clinical / shift goals:      Problem: Pain - Standard  Goal: Alleviation of pain or a reduction in pain to the patient’s comfort goal  Outcome: Progressing  Note: Pt medicated per MAR for pain control. Repositioning used as non-pharmacological adjunct.      Problem: Fall Risk  Goal: Patient will remain free from falls  Outcome: Progressing  Note: Pt in room close to nurses station, bed low and locked with strip alarm in bed and chair. Pt educated on fall prevention measures in place.      Problem: Knowledge Deficit - Standard  Goal: Patient and family/care givers will demonstrate understanding of plan of care, disease process/condition, diagnostic tests and medications  Outcome: Progressing  Note: Pt updated on POC for the day. Plan to be up in chair for meals, work with PT/OT and monitor drain output Q8h.        Patient is not progressing towards the following goals: n/a

## 2023-07-11 NOTE — DIETARY
NUTRITION SERVICES: BMI - Pt with BMI >40 (=Body mass index is 42.13 kg/m².), Class III obesity. Weight taken via stand up scale and pt is +3.7L fluids per I/O. Weight loss counseling not appropriate in acute care setting. RECOMMEND - If appropriate at DC please refer to outpatient nutrition services for weight management.

## 2023-07-11 NOTE — ANESTHESIA POSTPROCEDURE EVALUATION
Patient: Gonzalo Mao Jr.    Procedure Summary     Date: 07/10/23 Room / Location: [facility] 05 / SURGERY [facility]    Anesthesia Start: 1010 Anesthesia Stop: 1636    Procedure: L2-5 TRANSFORAMINAL LUMBAR INTERBODY FUSION WITH ROBOT (Spine Lumbar) Diagnosis: (SCOLIOSIS DEFORMITY OF SPINE)    Surgeons: Mauricio Larry M.D. Responsible Provider: Ken Adrian M.D.    Anesthesia Type: general ASA Status: 3          Final Anesthesia Type: general  Last vitals  BP   Blood Pressure : (!) 149/72    Temp   36.4 °C (97.6 °F)    Pulse   69   Resp   15    SpO2   98 %      Anesthesia Post Evaluation    Patient location during evaluation: PACU  Patient participation: complete - patient participated  Level of consciousness: awake and alert  Pain score: 3    Airway patency: patent  Anesthetic complications: no  Cardiovascular status: hemodynamically stable  Respiratory status: acceptable  Hydration status: euvolemic    PONV: none          No notable events documented.     Nurse Pain Score: 3 (NPRS)

## 2023-07-11 NOTE — CARE PLAN
The patient is Stable - Low risk of patient condition declining or worsening    Shift Goals  Clinical Goals: Pain management, monitor drain output  Patient Goals: Pain control, comfort  Family Goals: MARLEEN    Progress made toward(s) clinical / shift goals:  Patient is AAOx4, education provided on PCA pump and pain medication delivery. Education also provided on body mechanics while repositioning. Patient needs reinforcement on education provided. Bed low, locked and call light in reach.    Problem: Pain - Standard  Goal: Alleviation of pain or a reduction in pain to the patient’s comfort goal  Outcome: Progressing   Pain assessment in place with PCA pump, will continue to monitor  Problem: Knowledge Deficit - Standard  Goal: Patient and family/care givers will demonstrate understanding of plan of care, disease process/condition, diagnostic tests and medications  Outcome: Progressing   Patient understands education and POC  Problem: Lumbar/Thoracic Spine Surgery  Goal: Post-Operative Lumbar/Thoracic Spine Surgery: Patient will achieve optimal post-surgical outcomes  Outcome: Progressing   Patient able to turn himself and has full sensation and strength, will continue to monitor  Problem: Neuro Status  Goal: Neuro status will remain stable or improve  Outcome: Progressing   Neuro remains intact, patient is AAOx4  Problem: Respiratory/Oxygenation Function Post-Surgical  Goal: Patient will achieve/maintain normal respiratory rate/effort  Outcome: Progressing   Patient is on 2L nasal cannula, oxygen levels drop while patient is sleeping  Problem: Surgical Drain Management  Goal: Proper management/care of surgical drains will be maintained  Outcome: Progressing   Patient's drain remains in place and working properly, will monitor output    Patient is not progressing towards the following goals:    Problem: Knowledge Deficit - Neuro Surgical  Goal: Patient's understanding of spinal precautions, appropriate body mechanics and  incision care will improve  Outcome: Not Progressing   Patient needs reinforcement on movement. Informed patient he is able to move and ambulate if able, informed patient no spinal precautions in place except LSO brace when out of bed, will continue to reinforce

## 2023-07-11 NOTE — PROGRESS NOTES
"Size 5.5\" inch ext panels have been applied and fitted to pt's XXL Deroyal off the shelf LSO back support brace. Brace is now appearing to be fitting patient more user friendly as of now. Pt is tolerating fitting of brace well at this time.  "

## 2023-07-11 NOTE — ANESTHESIA TIME REPORT
Anesthesia Start and Stop Event Times     Date Time Event    7/10/2023 0843 Ready for Procedure     1010 Anesthesia Start     1636 Anesthesia Stop        Responsible Staff  07/10/23    Name Role Begin End    Ken Adrian M.D. Anesth 1010 1636        Overtime Reason:  no overtime (within assigned shift)    Comments:

## 2023-07-11 NOTE — PROGRESS NOTES
Size XXL Deroyal off the shelf LSO back support brace has been delivered to patient in PACU OR recovery unit to wear when he is ready.

## 2023-07-11 NOTE — PROGRESS NOTES
Neurosurgery Progress Note    Subjective:  Doing well  Pain controlled on pca, no leg s/s  Reynolds- urine clear  Iv fluids  Taking liquids, awaiting food  About to get oob w/ PT    Exam:  Str 5/5  Inc w/ hvac 180 cc last 8hrs    BP  Min: 108/51  Max: 149/77  Pulse  Av.8  Min: 69  Max: 99  Resp  Av.8  Min: 12  Max: 20  Temp  Av.7 °C (98.1 °F)  Min: 36.4 °C (97.6 °F)  Max: 37.1 °C (98.8 °F)  SpO2  Av.5 %  Min: 94 %  Max: 99 %    No data recorded    Recent Labs     23  0141   WBC 13.5*   RBC 3.61*   HEMOGLOBIN 10.9*   HEMATOCRIT 32.4*   MCV 89.8   MCH 30.2   MCHC 33.6   RDW 43.3   PLATELETCT 187   MPV 10.5     Recent Labs     23  0141   SODIUM 136   POTASSIUM 4.5   CHLORIDE 102   CO2 23   GLUCOSE 136*   BUN 13   CREATININE 0.88   CALCIUM 7.8*               Intake/Output                         07/10/23 0700 - 23 0659 23 07 - 23 0659      2909-1943 Total 8724-2428 1351-9767 Total                 Intake    P.O.  125  20 145  --  -- --    P.O. 125 20 145 -- -- --    I.V.  5000  784.3 5784.3  --  -- --    PCA End of Shift Total Volume (ml) -- 10.5 10.5 -- -- --    Volume (mL) (Lactated Ringers) 5000 -- 5000 -- -- --    Volume (mL) (NS infusion) -- 773.8 773.8 -- -- --    IV Piggyback  --    --  -- --    Volume (mL) (ceFAZolin (Ancef) 2 g in  mL IVPB) --  -- -- --    Total Intake 5125 999.3 6124.3 -- -- --       Output    Urine  250  700 950  --  -- --    Urine 250 -- 250 -- -- --    Output (mL) (Urethral Catheter Non-latex 16 Fr.) -- 700 700 -- -- --    Drains  150  675 825  --  -- --    Output (mL) (Closed/Suction Drain Back Hemovac) 150 675 825 -- -- --    Blood  550  -- 550  --  -- --    Est. Blood Loss 550 -- 550 -- -- --    Total Output 950 1375 2325 -- -- --       Net I/O     4175 -375.7 3799.3 -- -- --              Intake/Output Summary (Last 24 hours) at 2023 0814  Last data filed at 2023 0631  Gross per 24 hour   Intake  6124.28 ml   Output 2325 ml   Net 3799.28 ml             atorvastatin  10 mg DAILY    Pharmacy Consult Request  1 Each PHARMACY TO DOSE    MD ALERT...DO NOT ADMINISTER NSAIDS or ASPIRIN unless ORDERED By Neurosurgery  1 Each PRN    docusate sodium  100 mg BID    senna-docusate  1 Tablet Nightly    senna-docusate  1 Tablet Q24HRS PRN    polyethylene glycol/lytes  1 Packet BID PRN    magnesium hydroxide  30 mL QDAY PRN    bisacodyl  10 mg Q24HRS PRN    sodium phosphate  1 Each Once PRN    NS   Continuous    acetaminophen  1,000 mg Q6HRS    Followed by    [START ON 7/16/2023] acetaminophen  1,000 mg Q6HRS PRN    morphine   Continuous    diphenhydrAMINE  25 mg Q6HRS PRN    Or    diphenhydrAMINE  25 mg Q6HRS PRN    ondansetron  4 mg Q4HRS PRN    ondansetron  4 mg Q4HRS PRN    tizanidine  2 mg TID PRN    hydrALAZINE  10 mg Q HOUR PRN    benzocaine-menthol  1 Lozenge Q2HRS PRN       Assessment and Plan:    POD# 1 robotic assisted L2-5 TLIF  Chemical prophylactic DVT therapy: No  Start date/time: TBD    NM intact  Pain control- d/c pca and begin orals  Pt/ot/amb w/ LSO on when oob > 5 min  Cont hvac  H/h ok this am, recheck tomorrow and thurs  Heplock when PO well  D/c gannon  Bowel/bladder protocol  ? Home in a few days

## 2023-07-12 LAB
ANION GAP SERPL CALC-SCNC: 12 MMOL/L (ref 7–16)
BUN SERPL-MCNC: 26 MG/DL (ref 8–22)
CALCIUM SERPL-MCNC: 7.4 MG/DL (ref 8.5–10.5)
CHLORIDE SERPL-SCNC: 101 MMOL/L (ref 96–112)
CO2 SERPL-SCNC: 22 MMOL/L (ref 20–33)
CREAT SERPL-MCNC: 1.31 MG/DL (ref 0.5–1.4)
ERYTHROCYTE [DISTWIDTH] IN BLOOD BY AUTOMATED COUNT: 46.7 FL (ref 35.9–50)
GFR SERPLBLD CREATININE-BSD FMLA CKD-EPI: 59 ML/MIN/1.73 M 2
GLUCOSE SERPL-MCNC: 134 MG/DL (ref 65–99)
HCT VFR BLD AUTO: 29.7 % (ref 42–52)
HGB BLD-MCNC: 9.6 G/DL (ref 14–18)
MCH RBC QN AUTO: 29.8 PG (ref 27–33)
MCHC RBC AUTO-ENTMCNC: 32.3 G/DL (ref 32.3–36.5)
MCV RBC AUTO: 92.2 FL (ref 81.4–97.8)
PLATELET # BLD AUTO: 171 K/UL (ref 164–446)
PMV BLD AUTO: 10.6 FL (ref 9–12.9)
POTASSIUM SERPL-SCNC: 4.1 MMOL/L (ref 3.6–5.5)
RBC # BLD AUTO: 3.22 M/UL (ref 4.7–6.1)
SODIUM SERPL-SCNC: 135 MMOL/L (ref 135–145)
WBC # BLD AUTO: 11.6 K/UL (ref 4.8–10.8)

## 2023-07-12 PROCEDURE — 36415 COLL VENOUS BLD VENIPUNCTURE: CPT

## 2023-07-12 PROCEDURE — A9270 NON-COVERED ITEM OR SERVICE: HCPCS | Performed by: NEUROLOGICAL SURGERY

## 2023-07-12 PROCEDURE — 700102 HCHG RX REV CODE 250 W/ 637 OVERRIDE(OP): Performed by: NEUROLOGICAL SURGERY

## 2023-07-12 PROCEDURE — 80048 BASIC METABOLIC PNL TOTAL CA: CPT

## 2023-07-12 PROCEDURE — 85027 COMPLETE CBC AUTOMATED: CPT

## 2023-07-12 PROCEDURE — 700102 HCHG RX REV CODE 250 W/ 637 OVERRIDE(OP): Performed by: CLINICAL NURSE SPECIALIST

## 2023-07-12 PROCEDURE — A9270 NON-COVERED ITEM OR SERVICE: HCPCS | Performed by: CLINICAL NURSE SPECIALIST

## 2023-07-12 PROCEDURE — 770006 HCHG ROOM/CARE - MED/SURG/GYN SEMI*

## 2023-07-12 RX ADMIN — DOCUSATE SODIUM 100 MG: 100 CAPSULE, LIQUID FILLED ORAL at 04:28

## 2023-07-12 RX ADMIN — ACETAMINOPHEN 1000 MG: 500 TABLET, FILM COATED ORAL at 00:32

## 2023-07-12 RX ADMIN — OXYCODONE HYDROCHLORIDE 5 MG: 5 TABLET ORAL at 10:42

## 2023-07-12 RX ADMIN — OXYCODONE HYDROCHLORIDE 10 MG: 10 TABLET ORAL at 06:14

## 2023-07-12 RX ADMIN — SENNOSIDES AND DOCUSATE SODIUM 1 TABLET: 50; 8.6 TABLET ORAL at 21:39

## 2023-07-12 RX ADMIN — ACETAMINOPHEN 1000 MG: 500 TABLET, FILM COATED ORAL at 13:14

## 2023-07-12 RX ADMIN — ACETAMINOPHEN 1000 MG: 500 TABLET, FILM COATED ORAL at 04:27

## 2023-07-12 RX ADMIN — LORATADINE 10 MG: 10 TABLET ORAL at 04:28

## 2023-07-12 RX ADMIN — OXYCODONE HYDROCHLORIDE 5 MG: 5 TABLET ORAL at 21:44

## 2023-07-12 RX ADMIN — DOCUSATE SODIUM 100 MG: 100 CAPSULE, LIQUID FILLED ORAL at 17:46

## 2023-07-12 RX ADMIN — OXYCODONE HYDROCHLORIDE 5 MG: 5 TABLET ORAL at 17:46

## 2023-07-12 RX ADMIN — SENNOSIDES AND DOCUSATE SODIUM 1 TABLET: 50; 8.6 TABLET ORAL at 09:44

## 2023-07-12 RX ADMIN — OXYCODONE HYDROCHLORIDE 10 MG: 10 TABLET ORAL at 00:31

## 2023-07-12 RX ADMIN — MAGNESIUM HYDROXIDE 30 ML: 400 SUSPENSION ORAL at 17:44

## 2023-07-12 RX ADMIN — ATORVASTATIN CALCIUM 10 MG: 10 TABLET, FILM COATED ORAL at 04:28

## 2023-07-12 RX ADMIN — POLYETHYLENE GLYCOL 3350 1 PACKET: 17 POWDER, FOR SOLUTION ORAL at 09:44

## 2023-07-12 RX ADMIN — ACETAMINOPHEN 1000 MG: 500 TABLET, FILM COATED ORAL at 17:45

## 2023-07-12 ASSESSMENT — PAIN DESCRIPTION - PAIN TYPE
TYPE: ACUTE PAIN;SURGICAL PAIN
TYPE: ACUTE PAIN;SURGICAL PAIN

## 2023-07-12 NOTE — DOCUMENTATION QUERY
[facility]                                                                       Query Response Note      PATIENT:               HANG NIXON  ACCT #:                  [id number]  MRN:                     [id number]  :                      1955  ADMIT DATE:       7/10/2023 7:44 AM  DISCH DATE:          RESPONDING  PROVIDER #:        [id number]           QUERY TEXT:    BMI >40 is documented in the Medical Record.  Can a diagnosis be provided to support this finding?    The patient's Clinical Indicators include:   RD Eval: Body mass index is 42.13 kg/m², Class III obesity.    Treatment: RD eval; refer to OP wt. loss counseling if appropriate; stand up scale wt.  Risk Factors: BMI > 40    Thank You,  Ana Soares RN  Clinical    Connect via [employer]  Options provided:   -- Obese   -- Morbidly obese   -- Other explanation, (please specify other explanation)   -- Unable to determine      Query created by: Ana Soares on 2023 7:47 AM    RESPONSE TEXT:    Morbidly obese          Electronically signed by:  JIMENEZ TANG MD 2023 3:58 PM

## 2023-07-12 NOTE — THERAPY
"Occupational Therapy   Initial Evaluation     Patient Name: Gonzalo Mao Jr.  Age:  67 y.o., Sex:  male  Medical Record #: [id number]  Today's Date: 7/11/2023       Precautions: Fall Risk, Lumbosacral Orthosis, Spinal / Back Precautions   Comments: LSO when OOB >5 min    Assessment  Patient is 67 y.o. male with a diagnosis of low back pain.  Pt is s/p robotic assisted L2-5 TLIF.  Pt educated on spinal precautions during ADL's & homemaking tasks & reviewed written handout.  Pt reports his son can assist if needed upon D/C.  Pt educated on obtaining ice packs for home use along with a LH reacher.  Pt had some difficulty with retention of new learning.  Pt moves slowly & cautiously.  Pt should progress well & D/C home with  therapy.      Plan    Occupational Therapy Initial Treatment Plan   Treatment Interventions: Self Care / Activities of Daily Living, Adaptive Equipment, Neuro Re-Education / Balance, Therapeutic Exercises, Therapeutic Activity  Treatment Frequency: 3 Times per Week  Duration: Until Therapy Goals Met    DC Equipment Recommendations: Sock Aide, Reacher  Discharge Recommendations: Recommend home health for continued occupational therapy services     Subjective    \"I don't want to do anything to mess up this back surgery\"     Objective      Initial Contact Note    Initial Contact Note Order Received and Verified, Occupational Therapy Evaluation in Progress with Full Report to Follow.   Prior Living Situation   Prior Services None   Housing / Facility 1 Story House   Steps Into Home 2   Steps In Home 0   Bathroom Set up Walk In Shower   Equipment Owned Tub / Shower Seat;Hand Held Shower   Lives with - Patient's Self Care Capacity Adult Children   Comments Pt's son lives with him & works from home & can assist as needed   Prior Level of ADL Function   Self Feeding Independent   Grooming / Hygiene Independent   Bathing Independent   Dressing Independent   Toileting Independent   Prior Level of " IADL Function   Medication Management Independent   Laundry Independent   Kitchen Mobility Independent   Finances Independent   Home Management Requires Assist  (pt reports he has a )   Shopping Independent   Prior Level Of Mobility Independent Without Device in Community   Driving / Transportation Driving Independent   Occupation (Pre-Hospital Vocational) Retired Due To Age   Precautions   Precautions Fall Risk;Lumbosacral Orthosis;Spinal / Back Precautions    Comments LSO when OOB >5 min   Vitals   O2 Delivery Device None - Room Air   Pain   Pain Scales 0 to 10 Scale    Intervention Ambulation / Increased Activity   Pain 0 - 10 Group   Location Back;Incision   Location Orientation Posterior;Lower   Description Aching;Sharp;Sore   Therapist Pain Assessment During Activity;Nurse Notified;5  (pt premedicated with PCA)   Cognition    Cognition / Consciousness WDL   Level of Consciousness Alert   Comments Pleasant, receptive to new learning but very verbose & needs to be redirected to task   Passive ROM Upper Body   Passive ROM Upper Body WDL   Active ROM Upper Body   Active ROM Upper Body  WDL   Strength Upper Body   Upper Body Strength  WDL   Coordination Upper Body   Coordination WDL   Balance Assessment   Sitting Balance (Static) Fair +   Sitting Balance (Dynamic) Fair   Standing Balance (Static) Fair   Standing Balance (Dynamic) Fair   Weight Shift Sitting Fair   Weight Shift Standing Fair   Comments Pt moves slowly & cautiously   Bed Mobility    Sit to Supine Minimal Assist   Scooting Standby Assist   Rolling Supervised   Comments pt taught how to get OOB while log rolling   ADL Assessment   Eating Modified Independent   Grooming Supervision;Standing   Upper Body Dressing Supervision   Lower Body Dressing Moderate Assist   Toileting Minimal Assist   Comments Pt reports he has a bidet for home use   Functional Mobility   Sit to Stand Standby Assist   Bed, Chair, Wheelchair Transfer Contact Guard Assist    Toilet Transfers Contact Guard Assist  (using grab bars)   Transfer Method Stand Step   Mobility Pt amb with FWW to bathroom with SBA   Activity Tolerance   Sitting in Chair 45   Sitting Edge of Bed 5   Standing 12   Patient / Family Goals   Patient / Family Goal #1 I don't want to do anything to mess my back up   Short Term Goals   Short Term Goal # 1 Pt will don LSO with SBA   Short Term Goal # 2 Pt will dress LB with AE & supervision   Short Term Goal # 3 Pt will demonstrate spinal precauitons during ADL's   Education Group   Education Provided Home Safety;Activities of Daily Living;Spinal Precautions   Role of Occupational Therapist Patient Response Patient;Acceptance;Explanation;Verbal Demonstration   Spinal Precautions Patient Response Patient;Acceptance;Explanation;Demonstration;Handout;Verbal Demonstration;Action Demonstration;Reinforcement Needed   Brace Wear & Care Patient Response Patient;Acceptance;Explanation;Demonstration;Verbal Demonstration;Action Demonstration;Reinforcement Needed   Home Safety Patient Response Patient;Acceptance;Explanation;Handout;Demonstration;Verbal Demonstration;Reinforcement Needed   ADL Patient Response Patient;Acceptance;Explanation;Demonstration;Verbal Demonstration;Action Demonstration;Reinforcement Needed   Occupational Therapy Initial Treatment Plan    Treatment Interventions Self Care / Activities of Daily Living;Adaptive Equipment;Neuro Re-Education / Balance;Therapeutic Exercises;Therapeutic Activity   Treatment Frequency 3 Times per Week   Duration Until Therapy Goals Met   Problem List   Problem List Decreased Active Daily Living Skills;Decreased Homemaking Skills;Decreased Functional Mobility;Decreased Activity Tolerance;Limited Knowledge of Post Op Precautions   Anticipated Discharge Equipment and Recommendations   DC Equipment Recommendations Sock Aide;Reacher   Discharge Recommendations Recommend home health for continued occupational therapy services    Interdisciplinary Plan of Care Collaboration   IDT Collaboration with  Nursing   Patient Position at End of Therapy In Bed;Call Light within Reach;Tray Table within Reach;Phone within Reach   Collaboration Comments Nsg notified of OT findings   Session Information   Date / Session Number  7/11 #1 (1/3, 7/17)

## 2023-07-12 NOTE — DISCHARGE PLANNING
Skip is not requiring 2 of 3 disciplines.  TCC will no longer follow.  Please reach out to myself with any interval changes/questions.

## 2023-07-12 NOTE — OP REPORT
DATE OF SERVICE:  07/10/2023     PREOPERATIVE DIAGNOSIS:  Severe lumbar stenosis with scoliosis and positive   sagittal balance, all recalcitrant to nonoperative measures.     POSTOPERATIVE DIAGNOSIS:  Severe lumbar stenosis with scoliosis and positive   sagittal balance, all recalcitrant to nonoperative measures.     PROCEDURES:    1.  Bilateral pedicle screw instrumentation using Medtronic Solera screws at   lumbar 2, 3, 4, and 5.  2.  Use of ArborMetrix robotic navigation for screw placement at lumbar 2,   3, 4, 5.  3.  Posterolateral arthrodesis, lumbar 2, 3, 4, 5.  4.  Use of locally harvested morselized autograft.  5.  Use of allograft.  6.  Use of bone morphogenic protein.  7.  Bilateral lumbar laminectomy at lumbar 2, 3, 4, 5.  8.  Bilateral total facetectomies at lumbar 2-3, 3-4, 4-5.  9.  Right-sided diskectomies at lumbar 2-3, 3-4, 4-5 with interbody   arthrodesis.  10.  Implantation of Medtronic expandable cage at lumbar 2-3, 3-4, 4-5 from   the right side.  11.  Open reduction of the patient's scoliotic deformity at lumbar 2 through   5.  12.  Use of modifier 22 for extensive difficulty with decompressing the   patient from the right side given the severe stenosis and significant scar   tissue.  13.  Use of [employer] predictive analytics for planning of spine   reconstruction.     SURGEON:  Mauricio Larry MD     ASSISTANT:  FRANTZ Browne     ANESTHESIA:  General.     COMPLICATIONS:  None.     ESTIMATED BLOOD LOSS:  550 mL.     DESCRIPTION OF PROCEDURE:  The patient was brought to the operating room,   identified in the usual fashion.  General endotracheal anesthesia was induced   by the anesthesia team.  The patient was then placed prone on the Colten   table with bolsters.  All pressure points were meticulously padded.  Surgical   clippers were used to clip the patient's hair.  We then brought in   fluoroscopic guidance to ronni lumbar 2 through 5 in terms of where incision    should be in the midline.  The patient was then prepped and draped in the   usual sterile fashion.  Local anesthesia was infiltrated into the subcutaneous   tissue.  A 10 blade was used to incise the skin.  Dissection was carried down   in a subperiosteal fashion bilaterally.  Retractors were put in place.  We   marked with a Kocher at the spinous process at what we believed to be lumbar   2.  Film was taken confirming that we were at the correct level.  This was   then marked with a blue marking pen.  We then adjusted the retractors and   completed the exposure, exposing the joints and the transverse processes,   lamina and spinous processes of lumbar 2, 3, 4, 5.  Great care was taken not   to violate the joint capsule at lumbar 1-2.  We then turned our attention to   placing pedicle screws once we had the levels confirmed.  A stab incision was   made just above the left-sided PSIS.  We then inserted the PSIS pin with   excellent purchase.  We then attached the robot and the spinous process clamp   at the level of 1-2. We registered the robot with excellent accuracy.  We then   placed pedicle screws in an open fashion using robotic neuronavigation in the   following sequence of events.  Once docked at the correct location, a dilator   set above the entry point, we then cannulated the pedicle using a 2.5 mm   drill.  We then tapped the pedicle with a 5.5 mm tap.  We then placed 6.5x50   mm screws at all levels.  There were no complications.  We had excellent   registration throughout the case.  We then removed the robot, the spinous   process clamp and the PSIS pin in the usual sterile fashion.  We then turned   our attention to performing the decompression at lumbar 2, 3, 4, 5, which was   done with a combination of Leksell rongeur, high-speed air drill, Kerrison 2,   3, and 4 punches. On the concave portion of the curve, especially lumbar 3-4   and 4-5, the patient had a very profound central and lateral recess  stenosis   and foraminal stenosis.  This was very difficult to decompress, but we were   able to get excellent decompression all the way to the lateral border of the   pedicle.  We performed bilateral total facetectomies using a combination of   high-speed air drill and Kerrison 2 and 3 punches.  We first started with   lumbar 3-4.  We were unable to get into the disk space on the right side, so   we used a provisional anju between lumbar 3 and 4 and placed nut caps, final   tightened at lumbar 4.  We then used a  between lumbar 3 and 4 to   expand the opening of the disk space at lumbar 3-4.  We were able to get an   excellent opening.  We then used a nerve root retractor to retract the thecal   sac medially from the right side.  Bipolar electrocautery was used for   hemostasis.  We identified the 3 root and the 4 root shoulder.  We had an   excellent amount of room to perform TLIF.  We then cleaned out the disk space   using open box curettes and pituitaries to remove all the disk contents and to   prepare the endplates.  Once this was done at lumbar 3-4, antibiotic   irrigation was injected into the disk space.  We then packed a bone   morphogenic protein pledget and locally harvested morselized autograft into   the disk space.  A 7-14 mm Medtronic expandable cage was then placed into the   disk space and gently countersunk using a tamp.  Film was showing that it   looked to be in excellent position.  It was then opened up to the appropriate   size based on the preoperative planning.  At lumbar 3-4, this was 12 mm.  Once   we had finished at lumbar 3-4, hemostasis was achieved with bipolar and   FloSeal gentle tamponade, we then marched down to lumbar 4-5.  We performed   the same procedure.  It should be noted that before we moved down to lumbar   4-5 and before we expanded the cage at lumbar 3-4, we removed the temporary   anju that we had placed at lumbar 3-4 to open up the disk space.  Once moving    back down to lumbar 4-5, we were unable to get into the disk space as there   was a very severe lateral recess stenosis and foraminal stenosis.  We   performed a total facetectomy bilaterally at lumbar 4-5 as well.  We then used   the same temporary anju to open up the right side of the neural foramen to   allow us access into the disk space, which we were able to get excellent   access.  Bipolar electrocautery was used for hemostasis.  Nerve root retractor   was used to retract the thecal sac medially.  We then removed disk contents   as we had in the previous level with open box curettes and alley and a   pituitary.  We injected antibiotic irrigation into the disk space.  The   fragments were then removed with an alley and a pituitary.  We then packed   bone morphogenic protein pledgets into the disk space and gently countersunk   it.  We placed a locally harvested morselized autograft into the disk space as   well and then inserted the cage to the appropriate depth.  Film was taken   confirming that it looked to be in excellent position both AP and lateral.    The temporary anju was then removed from the right side.  We then opened up the   cage with excellent distraction up to 14 mm, which was the approved plan.    FloSeal gentle tamponade was then used for hemostasis.  We then moved up to   lumbar 2-3, where we performed the same right-sided diskectomy with interbody   arthrodesis and placed the same size cage, opened up to approximately 11 mm at   this stage.  Once all 3 cages were placed, all the hardware looked to be in   excellent position and we were at the correct levels, we then placed the   previously contoured rods on top of all the nuts.  We had excellent placement   of the rods, which just fell into the screw tulips.  The rods were a little   bit long, so we trimmed them both superiorly and inferiorly, but again they   sat right into the screws that we placed as planned. Nut caps were placed on   the  anju on the right side where we final tightened.  We then placed a anju on   the left, again it sat perfectly in the tulip holes as expected.  Again, we   trimmed superiorly and inferiorly on the anju to make at the appropriate   length. On the left side, however, we final tightened lumbar 2 and 3 without   any compression and then at lumbar 3-4 and lumbar 4-5, we used compression to   reduce the coronal deformity.  All screws were final tightened.  There were no   complications with this.  We then went back and checked the neural foramina   bilaterally to confirm we had excellent decompression.  There were no   complications with this.  We then decorticated the transverse processes of   lumbar 2-3 and 4-5 with a high-speed air drill.  We then packed in bone   morphogenic protein pledgets and locally harvested morselized autograft and   allograft into the gutters bilaterally for the posterolateral arthrodesis.    There were no complications.  We then placed a Hemovac drain in the epidural   space, brought out through a separate stab incision, secured to the skin using   Vicryl suture.  We then closed the incision in layers and topped with staples   and a sterile dressing.  There were no complications.  I was present and   scrubbed for the entire procedure.        ______________________________  MD ALDO Farris/TERRENCE/HARSH    DD:  07/11/2023 14:43  DT:  07/11/2023 16:15    Job#:  [id number]

## 2023-07-12 NOTE — CARE PLAN
Problem: Pain - Standard  Goal: Alleviation of pain or a reduction in pain to the patient’s comfort goal  Outcome: Progressing     Problem: Fall Risk  Goal: Patient will remain free from falls  Outcome: Progressing     Problem: Knowledge Deficit - Standard  Goal: Patient and family/care givers will demonstrate understanding of plan of care, disease process/condition, diagnostic tests and medications  Outcome: Progressing     Problem: Lumbar/Thoracic Spine Surgery  Goal: Post-Operative Lumbar/Thoracic Spine Surgery: Patient will achieve optimal post-surgical outcomes  Outcome: Progressing   The patient is Stable - Low risk of patient condition declining or worsening    Shift Goals  Clinical Goals: Pain control, increase mobilization, monitor drain outpt  Patient Goals: pain control, go home  Family Goals: MARLEEN    Progress made toward(s) clinical / shift goals:  pain control    Patient is not progressing towards the following goals:

## 2023-07-12 NOTE — PROGRESS NOTES
Neurosurgery Progress Note    Subjective:  Doing well- seen by Dr. Larry   Pain tolerable  Voiding  No bm yet  C/o rt 5th digit numbness since surgery         Exam:  AAOX4  Str 5/5  Inc w/ hvac 140 cc last 8hrs    BP  Min: 105/56  Max: 125/51  Pulse  Av.8  Min: 85  Max: 100  Resp  Av.3  Min: 18  Max: 19  Temp  Av.7 °C (98 °F)  Min: 36.5 °C (97.7 °F)  Max: 36.8 °C (98.3 °F)  SpO2  Av.3 %  Min: 92 %  Max: 95 %    No data recorded    Recent Labs     23  0141 23  0009   WBC 13.5* 11.6*   RBC 3.61* 3.22*   HEMOGLOBIN 10.9* 9.6*   HEMATOCRIT 32.4* 29.7*   MCV 89.8 92.2   MCH 30.2 29.8   MCHC 33.6 32.3   RDW 43.3 46.7   PLATELETCT 187 171   MPV 10.5 10.6       Recent Labs     23  0141 23  0009   SODIUM 136 135   POTASSIUM 4.5 4.1   CHLORIDE 102 101   CO2 23 22   GLUCOSE 136* 134*   BUN 13 26*   CREATININE 0.88 1.31   CALCIUM 7.8* 7.4*                 Intake/Output                         23 - 23 - 23 0659     [id number] 5515-2862 Total 0-0659 Total                 Intake    P.O.  --  -- --  240  -- 240    P.O. -- -- -- 240 -- 240    Total Intake -- -- -- 240 -- 240       Output    Urine  170  300 470  --  -- --    Number of Times Voided 1 x 2 x 3 x 1 x -- 1 x    Urine Void (mL) 170 300 470 -- -- --    Drains  660  300 960  --  -- --    Output (mL) (Closed/Suction Drain Back Hemovac) 660 300 960 -- -- --    Total Output  -- -- --       Net I/O     -830 -600 -1430 240 -- 240              Intake/Output Summary (Last 24 hours) at 2023 0911  Last data filed at 2023 0900  Gross per 24 hour   Intake 240 ml   Output 1430 ml   Net -1190 ml               oxyCODONE immediate-release  5 mg Q4HRS PRN    Or    oxyCODONE immediate-release  10 mg Q4HRS PRN    morphine injection  2 mg Q2HRS PRN    loratadine  10 mg DAILY    atorvastatin  10 mg DAILY    Pharmacy Consult Request  1 Each PHARMACY TO DOSE    MD ALERT...DO  NOT ADMINISTER NSAIDS or ASPIRIN unless ORDERED By Neurosurgery  1 Each PRN    docusate sodium  100 mg BID    senna-docusate  1 Tablet Nightly    senna-docusate  1 Tablet Q24HRS PRN    polyethylene glycol/lytes  1 Packet BID PRN    magnesium hydroxide  30 mL QDAY PRN    bisacodyl  10 mg Q24HRS PRN    sodium phosphate  1 Each Once PRN    acetaminophen  1,000 mg Q6HRS    Followed by    [START ON 7/16/2023] acetaminophen  1,000 mg Q6HRS PRN    diphenhydrAMINE  25 mg Q6HRS PRN    Or    diphenhydrAMINE  25 mg Q6HRS PRN    ondansetron  4 mg Q4HRS PRN    ondansetron  4 mg Q4HRS PRN    tizanidine  2 mg TID PRN    hydrALAZINE  10 mg Q HOUR PRN    benzocaine-menthol  1 Lozenge Q2HRS PRN       Assessment and Plan:    POD# 2 robotic assisted L2-5 TLIF  Chemical prophylactic DVT therapy: No  Start date/time: TBD    NM intact  Pain control- orals, prn muscle spasms   Pt/ot/amb w/ LSO on when oob > 5 min  Cont hvac until <30 cc in 8 hrs  H/h stable   Heplock when PO well  Bowel protocol - supp today  Does not want fww   ? Home tomorrow

## 2023-07-13 LAB
ANION GAP SERPL CALC-SCNC: 10 MMOL/L (ref 7–16)
BUN SERPL-MCNC: 15 MG/DL (ref 8–22)
CALCIUM SERPL-MCNC: 7.5 MG/DL (ref 8.5–10.5)
CHLORIDE SERPL-SCNC: 100 MMOL/L (ref 96–112)
CO2 SERPL-SCNC: 23 MMOL/L (ref 20–33)
CREAT SERPL-MCNC: 0.75 MG/DL (ref 0.5–1.4)
ERYTHROCYTE [DISTWIDTH] IN BLOOD BY AUTOMATED COUNT: 45.5 FL (ref 35.9–50)
ERYTHROCYTE [DISTWIDTH] IN BLOOD BY AUTOMATED COUNT: 46.1 FL (ref 35.9–50)
GFR SERPLBLD CREATININE-BSD FMLA CKD-EPI: 97 ML/MIN/1.73 M 2
GLUCOSE SERPL-MCNC: 118 MG/DL (ref 65–99)
HCT VFR BLD AUTO: 25.2 % (ref 42–52)
HCT VFR BLD AUTO: 26.1 % (ref 42–52)
HGB BLD-MCNC: 8.2 G/DL (ref 14–18)
HGB BLD-MCNC: 8.4 G/DL (ref 14–18)
MCH RBC QN AUTO: 29.7 PG (ref 27–33)
MCH RBC QN AUTO: 29.8 PG (ref 27–33)
MCHC RBC AUTO-ENTMCNC: 32.2 G/DL (ref 32.3–36.5)
MCHC RBC AUTO-ENTMCNC: 32.5 G/DL (ref 32.3–36.5)
MCV RBC AUTO: 91.3 FL (ref 81.4–97.8)
MCV RBC AUTO: 92.6 FL (ref 81.4–97.8)
PLATELET # BLD AUTO: 140 K/UL (ref 164–446)
PLATELET # BLD AUTO: 152 K/UL (ref 164–446)
PMV BLD AUTO: 10.3 FL (ref 9–12.9)
PMV BLD AUTO: 10.3 FL (ref 9–12.9)
POTASSIUM SERPL-SCNC: 3.5 MMOL/L (ref 3.6–5.5)
RBC # BLD AUTO: 2.76 M/UL (ref 4.7–6.1)
RBC # BLD AUTO: 2.82 M/UL (ref 4.7–6.1)
SODIUM SERPL-SCNC: 133 MMOL/L (ref 135–145)
WBC # BLD AUTO: 6.8 K/UL (ref 4.8–10.8)
WBC # BLD AUTO: 8.1 K/UL (ref 4.8–10.8)

## 2023-07-13 PROCEDURE — 97530 THERAPEUTIC ACTIVITIES: CPT | Mod: CQ

## 2023-07-13 PROCEDURE — 80048 BASIC METABOLIC PNL TOTAL CA: CPT

## 2023-07-13 PROCEDURE — 97535 SELF CARE MNGMENT TRAINING: CPT | Mod: CQ

## 2023-07-13 PROCEDURE — A9270 NON-COVERED ITEM OR SERVICE: HCPCS | Performed by: NEUROLOGICAL SURGERY

## 2023-07-13 PROCEDURE — 97116 GAIT TRAINING THERAPY: CPT | Mod: CQ

## 2023-07-13 PROCEDURE — RXMED WILLOW AMBULATORY MEDICATION CHARGE: Performed by: NURSE PRACTITIONER

## 2023-07-13 PROCEDURE — A9270 NON-COVERED ITEM OR SERVICE: HCPCS | Performed by: CLINICAL NURSE SPECIALIST

## 2023-07-13 PROCEDURE — 700102 HCHG RX REV CODE 250 W/ 637 OVERRIDE(OP): Performed by: NEUROLOGICAL SURGERY

## 2023-07-13 PROCEDURE — 36415 COLL VENOUS BLD VENIPUNCTURE: CPT

## 2023-07-13 PROCEDURE — 85027 COMPLETE CBC AUTOMATED: CPT

## 2023-07-13 PROCEDURE — 770006 HCHG ROOM/CARE - MED/SURG/GYN SEMI*

## 2023-07-13 PROCEDURE — 700102 HCHG RX REV CODE 250 W/ 637 OVERRIDE(OP): Performed by: CLINICAL NURSE SPECIALIST

## 2023-07-13 RX ORDER — PSEUDOEPHEDRINE HCL 30 MG
100 TABLET ORAL 2 TIMES DAILY
Qty: 60 CAPSULE | COMMUNITY
Start: 2023-07-13

## 2023-07-13 RX ORDER — ACETAMINOPHEN 500 MG
1000 TABLET ORAL EVERY 6 HOURS
Qty: 30 TABLET | Refills: 0 | COMMUNITY
Start: 2023-07-13

## 2023-07-13 RX ADMIN — DOCUSATE SODIUM 100 MG: 100 CAPSULE, LIQUID FILLED ORAL at 17:01

## 2023-07-13 RX ADMIN — POLYETHYLENE GLYCOL 3350 1 PACKET: 17 POWDER, FOR SOLUTION ORAL at 04:55

## 2023-07-13 RX ADMIN — ACETAMINOPHEN 1000 MG: 500 TABLET, FILM COATED ORAL at 17:01

## 2023-07-13 RX ADMIN — OXYCODONE HYDROCHLORIDE 5 MG: 5 TABLET ORAL at 21:08

## 2023-07-13 RX ADMIN — LORATADINE 10 MG: 10 TABLET ORAL at 04:55

## 2023-07-13 RX ADMIN — ACETAMINOPHEN 1000 MG: 500 TABLET, FILM COATED ORAL at 04:54

## 2023-07-13 RX ADMIN — SENNOSIDES AND DOCUSATE SODIUM 1 TABLET: 50; 8.6 TABLET ORAL at 21:08

## 2023-07-13 RX ADMIN — DOCUSATE SODIUM 100 MG: 100 CAPSULE, LIQUID FILLED ORAL at 04:55

## 2023-07-13 RX ADMIN — OXYCODONE HYDROCHLORIDE 10 MG: 10 TABLET ORAL at 16:06

## 2023-07-13 RX ADMIN — OXYCODONE HYDROCHLORIDE 10 MG: 10 TABLET ORAL at 07:47

## 2023-07-13 RX ADMIN — TIZANIDINE 2 MG: 4 TABLET ORAL at 21:09

## 2023-07-13 RX ADMIN — ATORVASTATIN CALCIUM 10 MG: 10 TABLET, FILM COATED ORAL at 04:55

## 2023-07-13 ASSESSMENT — PAIN DESCRIPTION - PAIN TYPE
TYPE: ACUTE PAIN;SURGICAL PAIN

## 2023-07-13 ASSESSMENT — GAIT ASSESSMENTS
GAIT LEVEL OF ASSIST: SUPERVISED
ASSISTIVE DEVICE: FRONT WHEEL WALKER
DEVIATION: ANTALGIC

## 2023-07-13 ASSESSMENT — COGNITIVE AND FUNCTIONAL STATUS - GENERAL
WALKING IN HOSPITAL ROOM: A LITTLE
STANDING UP FROM CHAIR USING ARMS: A LITTLE
MOBILITY SCORE: 18
SUGGESTED CMS G CODE MODIFIER MOBILITY: CK
MOVING FROM LYING ON BACK TO SITTING ON SIDE OF FLAT BED: A LITTLE
CLIMB 3 TO 5 STEPS WITH RAILING: A LITTLE
TURNING FROM BACK TO SIDE WHILE IN FLAT BAD: A LITTLE
MOVING TO AND FROM BED TO CHAIR: A LITTLE

## 2023-07-13 NOTE — DISCHARGE SUMMARY
DATE OF ADMISSION:  07/10/2023   DATE OF DISCHARGE:  07/14/2023     ADMITTING DIAGNOSES:  Severe lumbar stenosis with scoliosis and positive   sagittal balance, all recalcitrant to nonoperative measures.     COURSE OF HOSPITALIZATION:  The patient was admitted to [facility].  On day of admission, he was brought to the operating room   where he underwent posterior L2-L5 lumbar laminectomy and pedicle screw   instrumentation robotic-assisted with TLIF at L2-L3, L3-L4, and L4-L5 with Dr. Mauricio Larry on 7/10/2023.     Postoperatively, overall, the patient has done well.  His pain is tolerable.    He is not having any leg pain or numbness, tingling.  His lower extremity   strength is intact.  His incision is clean, dry and intact with his dressing.    His Hemovac has been draining.  It is slowing down.  His hemoglobin has been   trending down.  We are monitoring this.  We will check a lab this afternoon,   and if that is stable and his drain is able to come out, he will likely be   able to discharge home today.  His vital signs have been stable.  He is   voiding.  He has had a bowel movement.  He is ambulatory.     DISCHARGE PRESCRIPTIONS:  1.  Oxycodone 10 mg tablets, take a half to one every 4 hours p.r.n. pain,   #42, no refills.  2.  Tizanidine 2 mg tablets 1-2 p.o. t.i.d. p.r.n. muscle spasm, #40, no   refills.     Both these medications were sent electronically Meds-to-Beds.   has been   reviewed, no aberrant behaviors identified.  Consent and ORT are on file.     DISCHARGE INSTRUCTIONS: The patient was given standard postoperative   instructions.  He will follow up with our office at 2 and 6 weeks   postoperatively.  If he needs anything before his first appointment, he knows   not hesitate to contact us.  He knows he is ordered to wear his LSO brace at   all times when out of bed.        ______________________________  BRADY PARTIDA,  APN        ______________________________  MD ANNA Farris    DD:  07/13/2023 08:56  DT:  07/13/2023 10:35    Job#:  [id number]

## 2023-07-13 NOTE — DISCHARGE PLANNING
Case Management Discharge Planning    Admission Date: 7/10/2023  GMLOS: 3.7  ALOS: 3    6-Clicks ADL Score: 18  6-Clicks Mobility Score: 18      Anticipated Discharge Dispo: Discharge Disposition: Discharged to home/self care (01)  Discharge Address: [address] [place] [address] [address] [place] [address]    DME Needed: No    Action(s) Taken: DC Assessment Complete (See below)    LSW met with pt at bedside to complete DC assessment. Pt A&Ox4 and able to verify the information on the face sheet. Pt lives with his adult son in a single-story house that has one step to enter. Prior to this hospitalization pt was independent at home with ADLs and most IADLs. Pt does not use any DME at baseline. Pt reported that his son is a good support for him. Pt denies any SA or MH concerns. Pt does not have an advance directive. Pt stated his son will transport pt home.     Escalations Completed: None    Medically Clear: No    Next Steps:  f/u with pt and medical team to discuss dc needs and barriers.     Barriers to Discharge: Medical clearance        Care Transition Team Assessment    Information Source  Orientation Level: Oriented X4  Information Given By: Patient  Who is responsible for making decisions for patient? : Patient    Readmission Evaluation  Is this a readmission?: No    Elopement Risk  Legal Hold: No  Ambulatory or Self Mobile in Wheelchair: Yes  Disoriented: No  Psychiatric Symptoms: None  History of Wandering: No  Elopement this Admit: No  Vocalizing Wanting to Leave: No  Displays Behaviors, Body Language Wanting to Leave: No-Not at Risk for Elopement  Elopement Risk: Not at Risk for Elopement    Interdisciplinary Discharge Planning  Lives with - Patient's Self Care Capacity: Adult Children  Patient or legal guardian wants to designate a caregiver: No  Support Systems: Family Member(s), Children  Housing / Facility: 1 Story House  Prior Services: None  Durable Medical Equipment: Not Applicable    Discharge Preparedness  What is  your plan after discharge?: Home with help  What are your discharge supports?: Child  Prior Functional Level: Ambulatory, Drives Self, Independent with Activities of Daily Living, Independent with Medication Management  Difficulity with ADLs: None  Difficulity with IADLs: None    Functional Assesment  Prior Functional Level: Ambulatory, Drives Self, Independent with Activities of Daily Living, Independent with Medication Management    Finances  Financial Barriers to Discharge: No  Prescription Coverage: Yes    Vision / Hearing Impairment  Hearing Impairment: Both Ears, Hearing Device Not Available      Advance Directive  Advance Directive?: None    Domestic Abuse  Have you ever been the victim of abuse or violence?: No  Physical Abuse or Sexual Abuse: No  Verbal Abuse or Emotional Abuse: No  Possible Abuse/Neglect Reported to:: Not Applicable    Psychological Assessment  History of Substance Abuse: None  History of Psychiatric Problems: No  Non-compliant with Treatment: No    Discharge Risks or Barriers  Discharge risks or barriers?: No    Anticipated Discharge Information  Discharge Disposition: Discharged to home/self care (01)  Discharge Address: [address] [place] [address] [address] [place] [address]

## 2023-07-13 NOTE — THERAPY
"Physical Therapy   Daily Treatment     Patient Name: Gonzalo Mao Jr.  Age:  67 y.o., Sex:  male  Medical Record #: [id number]  Today's Date: 7/13/2023     Precautions  Precautions: Fall Risk;Lumbosacral Orthosis  Comments: LSO when OOB > 5 mins    Assessment    Pt willing to participate w/PT, had ?'s regarding don/doffing the LSO and log rolling from flat surface. Functionally the pt is now @ supervision level w/bed mobility, functional transfers w/wo FWW, amb hallway distances > 200' w/wo FWW, and stairs w/single rail support. The pt is cleared for d/c home w/FWW and outpt follow up once cleared by NeuroSx.   The pt is dc'd from PT services, all PT goals have been met.     Plan    Treatment Plan Status:    Type of Treatment: Bed Mobility, Equipment, Gait Training, Neuro Re-Education / Balance, Self Care / Home Evaluation, Stair Training, Therapeutic Exercise, Therapeutic Activities  Treatment Frequency: 5 Times per Week  Treatment Duration: Until Therapy Goals Met    DC Equipment Recommendations: (P) Front-Wheel Walker  Discharge Recommendations: (P) Recommend outpatient physical therapy services to address higher level deficits    Objective       07/13/23 1542   Charge Group   PT Gait Training (Units) 1   PT Therapeutic Activities (Units) 1   PT Self Care / Home Evaluation (Units) 1   Total Time Spent   PT Gait Training Time Spent (Mins) 10   PT Therapeutic Activities Time Spent (Mins) 20   PT Self Care/Home Evaluation Time Spent (Mins) 10   PT Total Time Spent (Calculated) 40   Precautions   Precautions Fall Risk;Lumbosacral Orthosis   Comments LSO when OOB > 5 mins   Pain 0 - 10 Group   Pain Rating Scale (NPRS) 4   Other Treatments   Other Treatments Provided Pt instructed on LSO management and wear time. The pt demo'd doning of LSO w/VC\"s throughout his efforts. Instructed on IS w/return demo.   Balance   Sitting Balance (Static) Fair +   Sitting Balance (Dynamic) Fair   Standing Balance (Static) Fair " +   Standing Balance (Dynamic) Fair   Weight Shift Sitting Fair   Weight Shift Standing Fair   Comments standing wo/FWW   Bed Mobility    Supine to Sit Supervised   Sit to Supine   (pt left seated in chair)   Scooting Supervised   Rolling Supervised   Comments Pt instructed on log rolling technique from flat surface and no rails. Pt initially struggled 2* LE pain but did manage the finish the task w/supervision only.   Gait Analysis   Gait Level Of Assist Supervised   Assistive Device Front Wheel Walker   Distance (Feet)   (hallway distances > 200')   # of Times Distance was Traveled 1   Deviation Antalgic   # of Stairs Climbed 2  (w/single rail)   Level of Assist with Stairs Supervised   Skilled Intervention Verbal Cuing   Comments Pt wanted to trial amb wo/FWW, cued to slow down his gait speed for safety. Educated the pt on the reasoning of the FWW for stability and upright posture until he re-gains his strength.   Functional Mobility   Sit to Stand Supervised   Bed, Chair, Wheelchair Transfer Supervised   Toilet Transfers Supervised   Skilled Intervention Verbal Cuing   Comments No assist needed w/his functional transfers w/wo AD   How much difficulty does the patient currently have...   Turning over in bed (including adjusting bedclothes, sheets and blankets)? 3   Sitting down on and standing up from a chair with arms (e.g., wheelchair, bedside commode, etc.) 3   Moving from lying on back to sitting on the side of the bed? 3   How much help from another person does the patient currently need...   Moving to and from a bed to a chair (including a wheelchair)? 3   Need to walk in a hospital room? 3   Climbing 3-5 steps with a railing? 3   6 clicks Mobility Score 18   Short Term Goals    Short Term Goal # 1 pt will go supine<>sit w/hob flat and rails down w/spv in 6tx via log rolling   Goal Outcome # 1 Goal met   Short Term Goal # 2 pt will go sit<>stand w/fww w/spv in 6tx   Goal Outcome # 2 Goal met   Short Term  Goal # 3 pt will ambulate for 150ft w/fww w/spv in 6tx   Goal Outcome # 3 Goal met   Short Term Goal # 4 pt will go up/down 2 steps w/LRD w/spv in 6tx   Goal Outcome # 4 Goal met   Education Group   Brace Wear & Care Patient Response Patient;Acceptance;Explanation;Demonstration;Action Demonstration   Physical Therapy Treatment Plan   Reason For Discharge Discharge Secondary to Goals Met   Anticipated Discharge Equipment and Recommendations   DC Equipment Recommendations Front-Wheel Walker   Discharge Recommendations Recommend outpatient physical therapy services to address higher level deficits   Interdisciplinary Plan of Care Collaboration   IDT Collaboration with  Nursing   Patient Position at End of Therapy Seated;Tray Table within Reach;Call Light within Reach;Phone within Reach   Collaboration Comments Nrsg notified of pts tx efforts   Session Information   Date / Session Number  Dc'd 7/13   Supervising Physical Therapist (PTA Treatments Only)   Supervising Physical Therapist Freda Morales

## 2023-07-13 NOTE — DISCHARGE INSTRUCTIONS
Follow up with DASHAWN at [facility] in 2 weeks [phone]  Follow up with Dr. Larry in 6 weeks  No pushing, pulling, lifting greater than 10 pounds  No repetitive bending, no twisting   Ok to shower, pat incision dry - 24 hours after drain was removed  No non-steroidal anti-inflammatory medications or aspirin until cleared by Dr. Larry  Ambulate as much is comfortable  No driving for at least 2 weeks following surgery or until cleared  Obtain over the counter senekot take 1-2 tablets daily while taking narcotic pain medication  Do not return to work until cleared by physician  Wear brace at all times when out of bed, may shower without brace.

## 2023-07-13 NOTE — PROGRESS NOTES
Neurosurgery Progress Note    Subjective:  Doing well-  Pain tolerable  Voiding, + bm  C/o rt 5th digit numbness since surgery - improving         Exam:  AAOX4  Str 5/5  Inc w/ hvac 70 cc last 8hrs    BP  Min: 127/57  Max: 155/74  Pulse  Av  Min: 90  Max: 96  Resp  Av.8  Min: 17  Max: 18  Temp  Av.7 °C (98 °F)  Min: 36.6 °C (97.8 °F)  Max: 36.8 °C (98.2 °F)  SpO2  Av.8 %  Min: 93 %  Max: 95 %    No data recorded    Recent Labs     23  01423  0155   WBC 13.5* 11.6* 8.1   RBC 3.61* 3.22* 2.76*   HEMOGLOBIN 10.9* 9.6* 8.2*   HEMATOCRIT 32.4* 29.7* 25.2*   MCV 89.8 92.2 91.3   MCH 30.2 29.8 29.7   MCHC 33.6 32.3 32.5   RDW 43.3 46.7 46.1   PLATELETCT 187 171 140*   MPV 10.5 10.6 10.3       Recent Labs     235   SODIUM 136 135 133*   POTASSIUM 4.5 4.1 3.5*   CHLORIDE 102 101 100   CO2 23 22 23   GLUCOSE 136* 134* 118*   BUN 13 26* 15   CREATININE 0.88 1.31 0.75   CALCIUM 7.8* 7.4* 7.5*                 Intake/Output                         23 - 23 - 2359      Total  Total                 Intake    P.O.  240  -- 240  --  -- --    P.O. 240 -- 240 -- -- --    Total Intake 240 -- 240 -- -- --       Output    Urine  --  -- --  --  -- --    Number of Times Voided 1 x 1 x 2 x 1 x -- 1 x    Drains  120  250 370  --  -- --    Output (mL) (Closed/Suction Drain Back Hemovac) 120 250 370 -- -- --    Stool  --  -- --  --  -- --    Number of Times Stooled -- -- -- 1 x -- 1 x    Total Output 120 250 370 -- -- --       Net I/O     120 -250 -130 -- -- --              Intake/Output Summary (Last 24 hours) at 2023 0861  Last data filed at 2023 0454  Gross per 24 hour   Intake 240 ml   Output 370 ml   Net -130 ml               oxyCODONE immediate-release  5 mg Q4HRS PRN    Or    oxyCODONE immediate-release  10 mg Q4HRS PRN    morphine injection  2 mg Q2HRS PRN     loratadine  10 mg DAILY    atorvastatin  10 mg DAILY    Pharmacy Consult Request  1 Each PHARMACY TO DOSE    MD ALERT...DO NOT ADMINISTER NSAIDS or ASPIRIN unless ORDERED By Neurosurgery  1 Each PRN    docusate sodium  100 mg BID    senna-docusate  1 Tablet Nightly    senna-docusate  1 Tablet Q24HRS PRN    polyethylene glycol/lytes  1 Packet BID PRN    magnesium hydroxide  30 mL QDAY PRN    bisacodyl  10 mg Q24HRS PRN    sodium phosphate  1 Each Once PRN    acetaminophen  1,000 mg Q6HRS    Followed by    [START ON 7/16/2023] acetaminophen  1,000 mg Q6HRS PRN    diphenhydrAMINE  25 mg Q6HRS PRN    Or    diphenhydrAMINE  25 mg Q6HRS PRN    ondansetron  4 mg Q4HRS PRN    ondansetron  4 mg Q4HRS PRN    tizanidine  2 mg TID PRN    hydrALAZINE  10 mg Q HOUR PRN    benzocaine-menthol  1 Lozenge Q2HRS PRN       Assessment and Plan:    POD# 3 robotic assisted L2-5 TLIF  Chemical prophylactic DVT therapy: No  Start date/time: TBD    NM intact  Pain control- orals, prn muscle spasms   Pt/ot/amb w/ LSO on when oob > 5 min  Cont hvac until <30 cc in 8 hrs  H/h trending down, recheck this afternoon  Does not want fww   ? Home later today if hgb stable and drain out  Will send oxy and tizanidine meds to beds

## 2023-07-13 NOTE — CARE PLAN
The patient is Stable - Low risk of patient condition declining or worsening    Shift Goals  Clinical Goals: Monitor hemovac output, BM  Patient Goals: Pain control, BM  Family Goals: anthony    Progress made toward(s) clinical / shift goals:      Problem: Knowledge Deficit - Standard  Goal: Patient and family/care givers will demonstrate understanding of plan of care, disease process/condition, diagnostic tests and medications  Outcome: Progressing   Pt's hemovac drain emptied Q8H. Output documented in appropriate I/O's.     Problem: Bowel Elimination - Post Surgical  Goal: Patient will resume regular bowel sounds and function with no discomfort or distention  Outcome: Progressing   Pt reports passing more gas throughout the night. Pt verbalizes understanding of having regular BM's and if no BM is done throughout the shift will escalate BM protocol.     Patient is not progressing towards the following goals:

## 2023-07-14 ENCOUNTER — PATIENT OUTREACH (OUTPATIENT)
Dept: SCHEDULING | Facility: IMAGING CENTER | Age: 68
End: 2023-07-14
Payer: MEDICARE

## 2023-07-14 ENCOUNTER — PHARMACY VISIT (OUTPATIENT)
Dept: PHARMACY | Facility: MEDICAL CENTER | Age: 68
End: 2023-07-14
Payer: COMMERCIAL

## 2023-07-14 VITALS
WEIGHT: 261.02 LBS | HEART RATE: 90 BPM | HEIGHT: 66 IN | OXYGEN SATURATION: 95 % | RESPIRATION RATE: 16 BRPM | SYSTOLIC BLOOD PRESSURE: 125 MMHG | BODY MASS INDEX: 41.95 KG/M2 | TEMPERATURE: 98 F | DIASTOLIC BLOOD PRESSURE: 63 MMHG

## 2023-07-14 LAB
ANION GAP SERPL CALC-SCNC: 8 MMOL/L (ref 7–16)
BUN SERPL-MCNC: 14 MG/DL (ref 8–22)
CALCIUM SERPL-MCNC: 7.7 MG/DL (ref 8.5–10.5)
CHLORIDE SERPL-SCNC: 105 MMOL/L (ref 96–112)
CO2 SERPL-SCNC: 24 MMOL/L (ref 20–33)
CREAT SERPL-MCNC: 0.73 MG/DL (ref 0.5–1.4)
ERYTHROCYTE [DISTWIDTH] IN BLOOD BY AUTOMATED COUNT: 45.5 FL (ref 35.9–50)
GFR SERPLBLD CREATININE-BSD FMLA CKD-EPI: 98 ML/MIN/1.73 M 2
GLUCOSE SERPL-MCNC: 109 MG/DL (ref 65–99)
HCT VFR BLD AUTO: 24.9 % (ref 42–52)
HGB BLD-MCNC: 8.1 G/DL (ref 14–18)
MCH RBC QN AUTO: 30.1 PG (ref 27–33)
MCHC RBC AUTO-ENTMCNC: 32.5 G/DL (ref 32.3–36.5)
MCV RBC AUTO: 92.6 FL (ref 81.4–97.8)
PLATELET # BLD AUTO: 155 K/UL (ref 164–446)
PMV BLD AUTO: 10.1 FL (ref 9–12.9)
POTASSIUM SERPL-SCNC: 3.9 MMOL/L (ref 3.6–5.5)
RBC # BLD AUTO: 2.69 M/UL (ref 4.7–6.1)
SODIUM SERPL-SCNC: 137 MMOL/L (ref 135–145)
WBC # BLD AUTO: 6.1 K/UL (ref 4.8–10.8)

## 2023-07-14 PROCEDURE — 97535 SELF CARE MNGMENT TRAINING: CPT

## 2023-07-14 PROCEDURE — 80048 BASIC METABOLIC PNL TOTAL CA: CPT

## 2023-07-14 PROCEDURE — 700102 HCHG RX REV CODE 250 W/ 637 OVERRIDE(OP): Performed by: CLINICAL NURSE SPECIALIST

## 2023-07-14 PROCEDURE — A9270 NON-COVERED ITEM OR SERVICE: HCPCS | Performed by: NEUROLOGICAL SURGERY

## 2023-07-14 PROCEDURE — 700102 HCHG RX REV CODE 250 W/ 637 OVERRIDE(OP): Performed by: NEUROLOGICAL SURGERY

## 2023-07-14 PROCEDURE — 36415 COLL VENOUS BLD VENIPUNCTURE: CPT

## 2023-07-14 PROCEDURE — A9270 NON-COVERED ITEM OR SERVICE: HCPCS | Performed by: CLINICAL NURSE SPECIALIST

## 2023-07-14 PROCEDURE — 85027 COMPLETE CBC AUTOMATED: CPT

## 2023-07-14 RX ADMIN — LORATADINE 10 MG: 10 TABLET ORAL at 04:11

## 2023-07-14 RX ADMIN — ACETAMINOPHEN 1000 MG: 500 TABLET, FILM COATED ORAL at 04:10

## 2023-07-14 RX ADMIN — BISACODYL 10 MG: 10 SUPPOSITORY RECTAL at 04:11

## 2023-07-14 RX ADMIN — OXYCODONE HYDROCHLORIDE 10 MG: 10 TABLET ORAL at 04:13

## 2023-07-14 RX ADMIN — DOCUSATE SODIUM 100 MG: 100 CAPSULE, LIQUID FILLED ORAL at 04:11

## 2023-07-14 RX ADMIN — ATORVASTATIN CALCIUM 10 MG: 10 TABLET, FILM COATED ORAL at 04:10

## 2023-07-14 ASSESSMENT — COGNITIVE AND FUNCTIONAL STATUS - GENERAL
PERSONAL GROOMING: A LITTLE
DRESSING REGULAR LOWER BODY CLOTHING: A LITTLE
DRESSING REGULAR UPPER BODY CLOTHING: A LITTLE
TOILETING: A LITTLE
SUGGESTED CMS G CODE MODIFIER DAILY ACTIVITY: CK
EATING MEALS: A LITTLE
HELP NEEDED FOR BATHING: A LITTLE
DAILY ACTIVITIY SCORE: 18

## 2023-07-14 ASSESSMENT — PAIN DESCRIPTION - PAIN TYPE
TYPE: ACUTE PAIN;SURGICAL PAIN

## 2023-07-14 NOTE — THERAPY
Occupational Therapy  Daily Treatment     Patient Name: Gonzalo Mao Jr.  Age:  67 y.o., Sex:  male  Medical Record #: [id number]  Today's Date: 7/14/2023     Precautions  Precautions: (P) Fall Risk, Lumbosacral Orthosis  Comments: LSO when OOB > 5 mins    Assessment    Pt seen for follow up OT tx session, pt able to complete ADLs and functional mobility with supervision, no physical assistance needed for functional tasks. Anticipate pt is close to his functional baseline, no acute OT needs identified.    Plan    Treatment Plan Status: (P) Modify Current Treatment Plan  Type of Treatment: Self Care / Activities of Daily Living, Adaptive Equipment, Neuro Re-Education / Balance, Therapeutic Exercises, Therapeutic Activity  Treatment Frequency: 3 Times per Week  Treatment Duration: Until Therapy Goals Met    DC Equipment Recommendations: (P) None  Discharge Recommendations: (P) Anticipate that the patient will have no further occupational therapy needs after discharge from the hospital    Objective       07/14/23 0940   Precautions   Precautions Fall Risk;Lumbosacral Orthosis   Cognition    Cognition / Consciousness WDL   Level of Consciousness Alert   Other Treatments   Other Treatments Provided Pt instructed on LSO management and ADLs to maintain spinal precautions   Balance   Sitting Balance (Static) Fair +   Sitting Balance (Dynamic) Fair   Standing Balance (Static) Fair +   Standing Balance (Dynamic) Fair   Weight Shift Sitting Fair   Weight Shift Standing Fair   Skilled Intervention Verbal Cuing;Compensatory Strategies   Comments w/ FWW   Bed Mobility    Supine to Sit Supervised   Sit to Supine Supervised   Scooting Supervised   Rolling Supervised   Skilled Intervention Verbal Cuing   Activities of Daily Living   Grooming Supervision;Standing   Upper Body Dressing Supervision   Lower Body Dressing Supervision   Toileting Supervision   Skilled Intervention Verbal Cuing   How much help from another person  does the patient currently need...   Putting on and taking off regular lower body clothing? 3   Bathing (including washing, rinsing, and drying)? 3   Toileting, which includes using a toilet, bedpan, or urinal? 3   Putting on and taking off regular upper body clothing? 3   Taking care of personal grooming such as brushing teeth? 3   Eating meals? 3   6 Clicks Daily Activity Score 18   Functional Mobility   Sit to Stand Supervised   Bed, Chair, Wheelchair Transfer Supervised   Toilet Transfers Supervised   Transfer Method Stand Step   Mobility bed mobility, ADLs at EOB, bathroom mobility, back to bed   Skilled Intervention Verbal Cuing   Comments no AD   Activity Tolerance   Sitting Edge of Bed 15 min   Standing 10 min   Patient / Family Goals   Patient / Family Goal #1 I don't want to do anything to mess my back up   Goal #1 Outcome Goal met   Short Term Goals   Short Term Goal # 1 Pt will don LSO with SBA   Goal Outcome # 1 Goal met   Short Term Goal # 2 Pt will dress LB with AE & supervision   Goal Outcome # 2 Goal met   Short Term Goal # 3 Pt will demonstrate spinal precauitons during ADL's   Goal Outcome # 3 Goal met   Education Group   Education Provided Role of Occupational Therapist;Activities of Daily Living;Spinal Precautions   Role of Occupational Therapist Patient Response Patient;Acceptance;Explanation;Verbal Demonstration   Spinal Precautions Patient Response Patient;Acceptance;Explanation;Demonstration;Handout;Verbal Demonstration;Action Demonstration;Reinforcement Needed   ADL Patient Response Patient;Acceptance;Explanation;Demonstration;Verbal Demonstration;Action Demonstration;Reinforcement Needed   Occupational Therapy Treatment Plan    O.T. Treatment Plan Modify Current Treatment Plan   Reason For Discharge Discharge Secondary to Goals Met   Anticipated Discharge Equipment and Recommendations   DC Equipment Recommendations None   Discharge Recommendations Anticipate that the patient will have no  further occupational therapy needs after discharge from the hospital   Interdisciplinary Plan of Care Collaboration   IDT Collaboration with  Nursing   Patient Position at End of Therapy In Bed;Call Light within Reach;Tray Table within Reach;Phone within Reach   Collaboration Comments RN updated

## 2023-07-14 NOTE — PROGRESS NOTES
Neurosurgery Progress Note    Subjective:  Doing well-  Pain tolerable  Voiding, + bm  rt 5th digit numbness since surgery - improving         Exam:  AAOX4  Str 5/5  Inc w/ hvac 40 cc last 8hrs    BP  Min: 109/50  Max: 125/63  Pulse  Av.8  Min: 80  Max: 97  Resp  Av.5  Min: 16  Max: 19  Temp  Av.7 °C (98 °F)  Min: 36.4 °C (97.5 °F)  Max: 36.8 °C (98.3 °F)  SpO2  Av.3 %  Min: 93 %  Max: 95 %    No data recorded    Recent Labs     23  0155 23  1409 23  0059   WBC 8.1 6.8 6.1   RBC 2.76* 2.82* 2.69*   HEMOGLOBIN 8.2* 8.4* 8.1*   HEMATOCRIT 25.2* 26.1* 24.9*   MCV 91.3 92.6 92.6   MCH 29.7 29.8 30.1   MCHC 32.5 32.2* 32.5   RDW 46.1 45.5 45.5   PLATELETCT 140* 152* 155*   MPV 10.3 10.3 10.1       Recent Labs     23  0009 23  0155 23  0059   SODIUM 135 133* 137   POTASSIUM 4.1 3.5* 3.9   CHLORIDE 101 100 105   CO2 22 23 24   GLUCOSE 134* 118* 109*   BUN 26* 15 14   CREATININE 1.31 0.75 0.73   CALCIUM 7.4* 7.5* 7.7*                 Intake/Output                         23 - 2359 23 - 07/15/23 0659      Total  Total                 Intake    P.O.  240  -- 240  --  -- --    P.O. 240 -- 240 -- -- --    Total Intake 240 -- 240 -- -- --       Output    Urine  --  -- --  --  -- --    Number of Times Voided 1 x 2 x 3 x -- -- --    Drains  110  100 210  --  -- --    Output (mL) (Closed/Suction Drain Back Hemovac) 110 100 210 -- -- --    Stool  --  -- --  --  -- --    Number of Times Stooled 1 x 1 x 2 x -- -- --    Total Output 110 100 210 -- -- --       Net I/O     130 -100 30 -- -- --              Intake/Output Summary (Last 24 hours) at 2023 0830  Last data filed at 2023 0407  Gross per 24 hour   Intake --   Output 210 ml   Net -210 ml               oxyCODONE immediate-release  5 mg Q4HRS PRN    Or    oxyCODONE immediate-release  10 mg Q4HRS PRN    morphine injection  2 mg Q2HRS PRN     loratadine  10 mg DAILY    atorvastatin  10 mg DAILY    Pharmacy Consult Request  1 Each PHARMACY TO DOSE    MD ALERT...DO NOT ADMINISTER NSAIDS or ASPIRIN unless ORDERED By Neurosurgery  1 Each PRN    docusate sodium  100 mg BID    senna-docusate  1 Tablet Nightly    senna-docusate  1 Tablet Q24HRS PRN    polyethylene glycol/lytes  1 Packet BID PRN    magnesium hydroxide  30 mL QDAY PRN    bisacodyl  10 mg Q24HRS PRN    sodium phosphate  1 Each Once PRN    acetaminophen  1,000 mg Q6HRS    Followed by    [START ON 7/16/2023] acetaminophen  1,000 mg Q6HRS PRN    diphenhydrAMINE  25 mg Q6HRS PRN    Or    diphenhydrAMINE  25 mg Q6HRS PRN    ondansetron  4 mg Q4HRS PRN    ondansetron  4 mg Q4HRS PRN    tizanidine  2 mg TID PRN    hydrALAZINE  10 mg Q HOUR PRN    benzocaine-menthol  1 Lozenge Q2HRS PRN       Assessment and Plan:    POD# 4 robotic assisted L2-5 TLIF  Chemical prophylactic DVT therapy: No  Start date/time: TBD    NM intact  Pain control- orals, prn muscle spasms   Pt/ot/amb w/ LSO on when oob > 5 min  Dc hvac  H/h stable   Does not want fww   Home today   oxy and tizanidine meds to beds

## 2023-07-14 NOTE — CARE PLAN
The patient is Stable - Low risk of patient condition declining or worsening    Shift Goals  Clinical Goals: Monitor hemovac output  Patient Goals: BM, discharge  Family Goals: anthony    Progress made toward(s) clinical / shift goals:      Problem: Knowledge Deficit - Standard  Goal: Patient and family/care givers will demonstrate understanding of plan of care, disease process/condition, diagnostic tests and medications  Outcome: Progressing   Pt's hemovac output monitored throughout shift and emptied Q8H. Output documented in appropriate I/O's.     Patient is not progressing towards the following goals:

## 2023-07-14 NOTE — PROGRESS NOTES
Hemovac drain removed. One stitch snipped and removed, intact. Slight drainage noted, not pain reported from patient. Pt tolerated well, dressed with 2x2 gauze and large tegaderm. Will monitor sight for pain and increased drainage.

## 2023-09-28 ENCOUNTER — OFFICE VISIT (OUTPATIENT)
Dept: SLEEP MEDICINE | Facility: MEDICAL CENTER | Age: 68
End: 2023-09-28
Attending: STUDENT IN AN ORGANIZED HEALTH CARE EDUCATION/TRAINING PROGRAM
Payer: MEDICARE

## 2023-09-28 VITALS
DIASTOLIC BLOOD PRESSURE: 74 MMHG | RESPIRATION RATE: 16 BRPM | WEIGHT: 240 LBS | HEIGHT: 70 IN | SYSTOLIC BLOOD PRESSURE: 124 MMHG | BODY MASS INDEX: 34.36 KG/M2 | OXYGEN SATURATION: 95 % | HEART RATE: 85 BPM

## 2023-09-28 DIAGNOSIS — Z23 NEED FOR INFLUENZA VACCINATION: ICD-10-CM

## 2023-09-28 DIAGNOSIS — Z96.82 S/P INSERTION OF HYPOGLOSSAL NERVE STIMULATOR: ICD-10-CM

## 2023-09-28 DIAGNOSIS — Z45.42 ENCOUNTER FOR ADJUSTMENT AND MANAGEMENT OF NEUROSTIMULATOR: ICD-10-CM

## 2023-09-28 DIAGNOSIS — G47.33 OSA (OBSTRUCTIVE SLEEP APNEA): ICD-10-CM

## 2023-09-28 PROCEDURE — 99213 OFFICE O/P EST LOW 20 MIN: CPT | Mod: 25 | Performed by: STUDENT IN AN ORGANIZED HEALTH CARE EDUCATION/TRAINING PROGRAM

## 2023-09-28 PROCEDURE — 99211 OFF/OP EST MAY X REQ PHY/QHP: CPT | Mod: 25 | Performed by: STUDENT IN AN ORGANIZED HEALTH CARE EDUCATION/TRAINING PROGRAM

## 2023-09-28 PROCEDURE — 90662 IIV NO PRSV INCREASED AG IM: CPT | Performed by: STUDENT IN AN ORGANIZED HEALTH CARE EDUCATION/TRAINING PROGRAM

## 2023-09-28 PROCEDURE — 3074F SYST BP LT 130 MM HG: CPT | Performed by: STUDENT IN AN ORGANIZED HEALTH CARE EDUCATION/TRAINING PROGRAM

## 2023-09-28 PROCEDURE — 95976 ALYS SMPL CN NPGT PRGRMG: CPT | Performed by: STUDENT IN AN ORGANIZED HEALTH CARE EDUCATION/TRAINING PROGRAM

## 2023-09-28 PROCEDURE — 3078F DIAST BP <80 MM HG: CPT | Performed by: STUDENT IN AN ORGANIZED HEALTH CARE EDUCATION/TRAINING PROGRAM
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
## 2023-11-22 ENCOUNTER — HOSPITAL ENCOUNTER (OUTPATIENT)
Dept: RADIOLOGY | Facility: MEDICAL CENTER | Age: 68
End: 2023-11-22
Attending: NEUROLOGICAL SURGERY
Payer: MEDICARE

## 2023-11-22 DIAGNOSIS — R51.9 INTRACTABLE HEADACHE, UNSPECIFIED CHRONICITY PATTERN, UNSPECIFIED HEADACHE TYPE: ICD-10-CM

## 2023-11-22 PROCEDURE — 70450 CT HEAD/BRAIN W/O DYE: CPT

## 2023-11-28 ENCOUNTER — APPOINTMENT (OUTPATIENT)
Dept: SLEEP MEDICINE | Facility: MEDICAL CENTER | Age: 68
End: 2023-11-28
Attending: STUDENT IN AN ORGANIZED HEALTH CARE EDUCATION/TRAINING PROGRAM
Payer: MEDICARE

## 2023-12-28 ENCOUNTER — TELEPHONE (OUTPATIENT)
Dept: HEALTH INFORMATION MANAGEMENT | Facility: OTHER | Age: 68
End: 2023-12-28

## 2024-01-10 ENCOUNTER — HOSPITAL ENCOUNTER (OUTPATIENT)
Dept: LAB | Facility: MEDICAL CENTER | Age: 69
End: 2024-01-10
Attending: UROLOGY
Payer: MEDICARE

## 2024-01-10 PROCEDURE — 36415 COLL VENOUS BLD VENIPUNCTURE: CPT

## 2024-01-10 PROCEDURE — 84153 ASSAY OF PSA TOTAL: CPT

## 2024-01-11 LAB — PSA SERPL-MCNC: 0.78 NG/ML (ref 0–4)

## 2024-02-07 ENCOUNTER — HOSPITAL ENCOUNTER (OUTPATIENT)
Dept: RADIOLOGY | Facility: MEDICAL CENTER | Age: 69
End: 2024-02-07
Payer: MEDICARE

## 2024-02-07 DIAGNOSIS — Z79.899 HIGH RISK MEDICATIONS (NOT ANTICOAGULANTS) LONG-TERM USE: ICD-10-CM

## 2024-02-07 DIAGNOSIS — Z51.81 MEDICATION MONITORING ENCOUNTER: ICD-10-CM

## 2024-02-07 DIAGNOSIS — M80.80XD OTHER OSTEOPOROSIS WITH CURRENT PATHOLOGICAL FRACTURE, UNSPECIFIED SITE, SUBSEQUENT ENCOUNTER FOR FRACTURE WITH ROUTINE HEALING: ICD-10-CM

## 2024-02-07 DIAGNOSIS — H91.23 SUDDEN IDIOPATHIC HEARING LOSS OF BOTH EARS: ICD-10-CM

## 2024-02-07 PROCEDURE — 77080 DXA BONE DENSITY AXIAL: CPT

## 2024-02-16 ENCOUNTER — TELEPHONE (OUTPATIENT)
Dept: HEALTH INFORMATION MANAGEMENT | Facility: OTHER | Age: 69
End: 2024-02-16
Payer: MEDICARE

## 2024-02-22 ENCOUNTER — OFFICE VISIT (OUTPATIENT)
Dept: SLEEP MEDICINE | Facility: MEDICAL CENTER | Age: 69
End: 2024-02-22
Attending: STUDENT IN AN ORGANIZED HEALTH CARE EDUCATION/TRAINING PROGRAM
Payer: MEDICARE

## 2024-02-22 VITALS
SYSTOLIC BLOOD PRESSURE: 138 MMHG | BODY MASS INDEX: 34.36 KG/M2 | DIASTOLIC BLOOD PRESSURE: 68 MMHG | RESPIRATION RATE: 16 BRPM | OXYGEN SATURATION: 95 % | HEART RATE: 80 BPM | WEIGHT: 240 LBS | HEIGHT: 70 IN

## 2024-02-22 DIAGNOSIS — G47.33 OSA (OBSTRUCTIVE SLEEP APNEA): ICD-10-CM

## 2024-02-22 DIAGNOSIS — Z45.42 ENCOUNTER FOR ADJUSTMENT AND MANAGEMENT OF NEUROSTIMULATOR: ICD-10-CM

## 2024-02-22 DIAGNOSIS — Z96.82 S/P INSERTION OF HYPOGLOSSAL NERVE STIMULATOR: ICD-10-CM

## 2024-02-22 PROCEDURE — 95977 ALYS CPLX CN NPGT PRGRMG: CPT | Performed by: STUDENT IN AN ORGANIZED HEALTH CARE EDUCATION/TRAINING PROGRAM

## 2024-02-22 PROCEDURE — 99213 OFFICE O/P EST LOW 20 MIN: CPT | Performed by: STUDENT IN AN ORGANIZED HEALTH CARE EDUCATION/TRAINING PROGRAM

## 2024-02-22 PROCEDURE — 99212 OFFICE O/P EST SF 10 MIN: CPT | Performed by: STUDENT IN AN ORGANIZED HEALTH CARE EDUCATION/TRAINING PROGRAM

## 2024-02-22 PROCEDURE — 3078F DIAST BP <80 MM HG: CPT | Performed by: STUDENT IN AN ORGANIZED HEALTH CARE EDUCATION/TRAINING PROGRAM

## 2024-02-22 PROCEDURE — 3075F SYST BP GE 130 - 139MM HG: CPT | Performed by: STUDENT IN AN ORGANIZED HEALTH CARE EDUCATION/TRAINING PROGRAM

## 2024-02-22 ASSESSMENT — PATIENT HEALTH QUESTIONNAIRE - PHQ9: CLINICAL INTERPRETATION OF PHQ2 SCORE: 0

## 2024-02-22 NOTE — PROGRESS NOTES
[facility] Follow-up Visit    CC: Follow-up regarding management of obstructive sleep apnea      HPI:  Gonzalo Mao Jr. is a 68 y.o.male  with hyperlipidemia, BPH, obesity, spinal stenosis status post fusion and obstructive sleep apnea status post hypoglossal nerve stimulator.  Presents today to follow-up regarding management of obstructive sleep apnea.    He has not been using his device regularly.  He finds that it is too strong.  He felt with his back surgery last year that he had to take a break from the device due to somebody things going on at once.  When he tried to go back to using the device he found it was uncomfortable and unable to use.  He did lowered to the lowest setting possible and is still too strong at times.  He is also curious as to the delay.  He finds that the delay is not long enough at some nights and it comes on prior to him getting to sleep.  He is currently not using anything for his sleep apnea.    Programming and interrogation of hypoglossal nerve stimulator  Stimulation was decreased to 0.9 V to assess for sensation and comfort.  Patient did notice stimulation.  Tongue retrusion was noted.  Increased voltage by 0.1 V increments up to 1.2.  Patient found this to be comfortable at time protrusion was noted.  Patient wanting to try closer settings to what he came in at 1.7.  He did find that 1.7 V was too strong.  Adjustments were made to timing as well as patient control range.  Advised that on previous sleep study he did have relatively well-controlled at 2.2 V.    Incoming settings  1)Start Delay: 20 min  2)Pause Time: 20 min  3)Total run time: 8 hours  4) Minimum voltage 1.7 v   5) Maximum voltage 2.3v  6) Pulse configuration (- off -)  7) Pulse Width 90 microseconds  8) Rate 33 Hz  9) Current voltage 1.7v    Outgoing (changes in bold)  1)Start Delay: 30 min  2)Pause Time: 30 min  3)Total run time: 8 hours  4) Minimum voltage 1.2 v   5) Maximum voltage 2.2v  6)  Pulse configuration (- off -)  7) Pulse Width 90 microseconds  8) Rate 33 Hz  9) Current voltage 1.2v      Sleep History  Split night study on 7/18/21 which showed Severe obstructive sleep apnea with AHI of 97.8/hr and O2 liz 67 %. Due to severity of the disease he met the split study protocol. The titration started with CPAP 5 cm and the ending pressure was ResMed ASVAuto at EPAP 4/10, PS 3/10 cm. The AHI improved to 67.32/hr with improved O2 liz of 80% and average O2 saturation of 90 %. He spent 36 % of sleep time below 88% O2 saturation  5/3/2023 PSG inspire fine-tune study showed some improvement respiratory events at a voltage of 2.2.  He had 47 minutes of sleep at this voltage with an AHI of 17.7    Patient Active Problem List    Diagnosis Date Noted    Lumbar stenosis with neurogenic claudication 07/10/2023    Spinal stenosis of lumbar region with radiculopathy 02/27/2023       Past Medical History:   Diagnosis Date    Arthritis 06/14/2023    knees, back    Heart burn 06/14/2023    prilosec prn    High cholesterol 06/14/2023    medicated    Hypertension 06/14/2023    controlled on meds    Indigestion     Pain 06/14/2023    low back    Sleep apnea 06/14/2023    +GLENN had inspire implant placed    Snoring         Past Surgical History:   Procedure Laterality Date    FUSION, SPINE, LUMBAR, ROBOT-ASSISTED WITH IMAGING GUIDANCE  7/10/2023    Procedure: L2-5 TRANSFORAMINAL LUMBAR INTERBODY FUSION WITH ROBOT;  Surgeon: Mauricio Larry M.D.;  Location: SURGERY [facility];  Service: Neurosurgery    OH OPEN IMPLTJ HPGLSL NRV NSTIM RA PG&RESPIR SENSOR Right 9/19/2022    Procedure: INSERTION OF HYPOGLOSSAL NERVE NEUROSTIMULATOR ELECTRODE AND GENERATOR AND BREATHING SENSOR ELECTRODE RIGHT.;  Surgeon: Aguilar Raza M.D.;  Location: SURGERY SAME DAY [facility];  Service: Ent    OH DISE DYN EVAL SLEEP DISORDERED BREATHING FLX DX N/A 7/18/2022    Procedure: DRUG INDUCED SLEEP ENDOSCOPY;  Surgeon: Aguilar Raza  M.D.;  Location: SURGERY SAME DAY [facility];  Service: Ent    OTHER  06/2022    epidural blocks    KNEE ARTHROSCOPY Right 01/2011       Family History   Problem Relation Age of Onset    Sleep Apnea Neg Hx        Social History     Socioeconomic History    Marital status: Single     Spouse name: Not on file    Number of children: Not on file    Years of education: Not on file    Highest education level: Not on file   Occupational History    Not on file   Tobacco Use    Smoking status: Never    Smokeless tobacco: Never   Vaping Use    Vaping Use: Never used   Substance and Sexual Activity    Alcohol use: Not Currently     Comment: rarely    Drug use: Not Currently    Sexual activity: Not on file   Other Topics Concern    Not on file   Social History Narrative    Not on file     Social Determinants of Health     Financial Resource Strain: Not on file   Food Insecurity: Not on file   Transportation Needs: Not on file   Physical Activity: Not on file   Stress: Not on file   Social Connections: Not on file   Intimate Partner Violence: Not on file   Housing Stability: Not on file       Current Outpatient Medications   Medication Sig Dispense Refill    acetaminophen (TYLENOL) 500 MG Tab Take 2 Tablets by mouth every 6 hours. 30 Tablet 0    docusate sodium 100 MG Cap Take 100 mg by mouth 2 times a day. 60 Capsule     tizanidine (ZANAFLEX) 2 MG tablet take 1-2 tablets orally 3 times daily as needed for muscle spasms 40 Tablet 0    atorvastatin (LIPITOR) 10 MG Tab Take 10 mg by mouth every day.      oxyCODONE immediate release (ROXICODONE) 10 MG immediate release tablet take 1/2-1 tablet po every 4 hours as needed for pain 42 Tablet 0     No current facility-administered medications for this visit.        ALLERGIES: Patient has no known allergies.    ROS  Constitutional: Denies fevers, Denies weight changes  Ears/Nose/Throat/Mouth: Denies nasal congestion or sore throat   Cardiovascular: Denies chest pain  Respiratory: Denies  "shortness of breath, Denies cough  Gastrointestinal/Hepatic: Denies nausea, vomiting  Sleep: see HPI      PHYSICAL EXAM  /68 (BP Location: Left arm, Patient Position: Sitting, BP Cuff Size: Adult)   Pulse 80   Resp 16   Ht 1.778 m (5' 10\")   Wt 109 kg (240 lb)   SpO2 95%   BMI 34.44 kg/m²   Appearance: Well-nourished, well-developed, no acute distress  Eyes:  No scleral icterus , EOMI  Musculoskeletal:  Grossly normal; gait and station normal; digits and nails normal  Skin:  No rashes, petechiae, cyanosis  Neurologic: without focal signs; oriented to person, time, place, and purpose; judgement intact      Medical Decision Making   Assessment and Plan  Gonzalo Mao Jr. is a 68 y.o.male  with hyperlipidemia, BPH, obesity, spinal stenosis status post fusion and obstructive sleep apnea status post hypoglossal nerve stimulator.  Presents today to follow-up regarding management of obstructive sleep apnea.    The medical record was reviewed.    Obstructive sleep apnea  Reviewed recent usage data and patient has not been using device.  Discussed that tolerance can be lost with prolonged periods of not using the inspire device.  Recommended he restart using the device again nightly.  Adjustments were made at today's visit to significantly lower his stimulation voltage.  Advised patient to increase 1 setting every 5 to 7 days.  Reviewed previous sleep study showing a voltage of 2.2 did improve his sleep apnea.    PLAN:   -Restart using device nightly  -Increase by 1 voltage setting every 5 to 7 days  -Advised to reach out via MyChart with questions     Patients with GLENN are at increased risk of cardiovascular disease including coronary artery disease, systemic arterial hypertension, pulmonary arterial hypertension, cardiac arrythmias, and stroke. The patient was advised to avoid driving a motor vehicle when drowsy.    Have advised the patient to follow up with the appropriate healthcare practitioners " for all other medical problems and issues.    Return in about 6 weeks (around 4/4/2024).      Please note portions of this record was created using voice recognition software. I have made every reasonable attempt to correct obvious errors, but I expect that there are errors of grammar and possibly content I did not discover before finalizing the note.

## 2024-03-28 PROCEDURE — RXMED WILLOW AMBULATORY MEDICATION CHARGE: Performed by: FAMILY MEDICINE

## 2024-03-29 ENCOUNTER — PHARMACY VISIT (OUTPATIENT)
Dept: PHARMACY | Facility: MEDICAL CENTER | Age: 69
End: 2024-03-29
Payer: COMMERCIAL

## 2024-04-11 ENCOUNTER — APPOINTMENT (OUTPATIENT)
Dept: SLEEP MEDICINE | Facility: MEDICAL CENTER | Age: 69
End: 2024-04-11
Attending: STUDENT IN AN ORGANIZED HEALTH CARE EDUCATION/TRAINING PROGRAM
Payer: MEDICARE

## 2024-05-21 RX ORDER — SEMAGLUTIDE 0.25 MG/.5ML
INJECTION, SOLUTION SUBCUTANEOUS
Qty: 2 ML | Refills: 0 | Status: CANCELLED | OUTPATIENT
Start: 2024-05-21

## (undated) DEVICE — KIT EVACUATER 3 SPRING PVC LF 1/8 DRAIN SIZE (10EA/CA)"

## (undated) DEVICE — SUTURE 3-0 PROLENE SH 30 (36PK/BX)"

## (undated) DEVICE — SODIUM CHL IRRIGATION 0.9% 1000ML (12EA/CA)

## (undated) DEVICE — TOWEL STOP TIMEOUT SAFETY FLAG (40EA/CA)

## (undated) DEVICE — ANTI-FOG SOLUTION - 60BTL/CA

## (undated) DEVICE — BLADE SURGICAL CLIPPER - (50EA/CA)

## (undated) DEVICE — PACK JACKSON TABLE KIT W/OUT - HR (6EA/CA)

## (undated) DEVICE — TUBE CONNECT SUCTION CLEAR 120 X 1/4" (50EA/CA)"

## (undated) DEVICE — SUTURE GENERAL

## (undated) DEVICE — SPHERE NAVIGATION STEALTH (5EA/TY 12TY/PK)

## (undated) DEVICE — GLOVE BIOGEL PI INDICATOR SZ 6.5 SURGICAL PF LF - (50/BX 4BX/CA)

## (undated) DEVICE — TUBING CLEARLINK DUO-VENT - C-FLO (48EA/CA)

## (undated) DEVICE — GLOVE BIOGEL INDICATOR SZ 6.5 SURGICAL PF LTX - (50PR/BX 4BX/CA)

## (undated) DEVICE — COVER CIV-FLEX TRANSDUCER - (24/BX)

## (undated) DEVICE — DRAPE 36X28IN RAD CARM BND BG - (25/CA) O

## (undated) DEVICE — PEN SKIN MARKER W/RULER - (50EA/BX)

## (undated) DEVICE — NEEDLE NON-SAFETY HYPO 21 GA X 1 1/2 IN HYPO (100/BX)

## (undated) DEVICE — CANNULA W/ SUPPLY TUBING O2 - (50/CA)

## (undated) DEVICE — ELECTRODE DUAL RETURN W/ CORD - (50/PK)

## (undated) DEVICE — SUTURE 3-0 VICRYL PLUS SH - 8X 18 INCH (12/BX)

## (undated) DEVICE — TOWELS CLOTH SURGICAL - (4/PK 20PK/CA)

## (undated) DEVICE — DRAPE IOBAN II INCISE 23X17 - (10EA/BX 4BX/CA)

## (undated) DEVICE — GLOVE SZ 7.5 BIOGEL PI MICRO - PF LF (50PR/BX)

## (undated) DEVICE — SET EXTENSION WITH 2 PORTS (48EA/CA) ***PART #2C8610 IS A SUBSTITUTE*****

## (undated) DEVICE — FORCEPS ELECTROSURGICAL DISPOSABLE CODMAN 8IN 1.5MM

## (undated) DEVICE — SUTURE 0 VICRYL PLUS CT-2 - 8 X 18 INCH (12/BX)

## (undated) DEVICE — SENSOR OXIMETER ADULT SPO2 RD SET (20EA/BX)

## (undated) DEVICE — CATHETER IV 14 GA X 2 ---SURG.& SDS ONLY---(200EA/CA)

## (undated) DEVICE — SUTURE 3-0 SILK RB-1 C/R (12/BX)

## (undated) DEVICE — DRESSING POST OP BORDER 4IN X 12 IN (25EA/CA)

## (undated) DEVICE — TUBE CONNECTING SUCTION - CLEAR PLASTIC STERILE 72 IN (50EA/CA)

## (undated) DEVICE — WATER IRRIGATION STERILE 1000ML (12EA/CA)

## (undated) DEVICE — DEPRESSOR TONGUE ADLT STERILE - 6 IN (100EA/BX)

## (undated) DEVICE — DRAPE STRLE REG TOWEL 18X24 - (10/BX 4BX/CA)"

## (undated) DEVICE — BOVIE BLADE COATED &INSULATED - 25/PK

## (undated) DEVICE — CANISTER SUCTION RIGID RED 1500CC (40EA/CA)

## (undated) DEVICE — CATHETER IV 20 GA X 1-1/4 ---SURG.& SDS ONLY--- (50EA/BX)

## (undated) DEVICE — LACTATED RINGERS INJ 1000 ML - (14EA/CA 60CA/PF)

## (undated) DEVICE — SYRINGE NON SAFETY 3 CC 21 GA X 1 1/2 IN (100/BX 8BX/CA)

## (undated) DEVICE — SUCTION INSTRUMENT YANKAUER BULBOUS TIP W/O VENT (50EA/CA)

## (undated) DEVICE — ROBOTIC SURGERY SERVICES

## (undated) DEVICE — SYRINGE NON SAFETY 10 CC 20 GA X 1-1/2 IN (100/BX 4BX/CA)

## (undated) DEVICE — SLEEVE, VASO, THIGH, MED

## (undated) DEVICE — BONE MILL BM210

## (undated) DEVICE — SLEEVE VASO CALF MED - (10PR/CA)

## (undated) DEVICE — GLOVE BIOGEL SZ 7.5 SURGICAL PF LTX - (50PR/BX 4BX/CA)

## (undated) DEVICE — DRAPE CHEST/BREAST - (12EA/CA)

## (undated) DEVICE — SET LEADWIRE 5 LEAD BEDSIDE DISPOSABLE ECG (1SET OF 5/EA)

## (undated) DEVICE — PACK ENT OR - (2EA/CA)

## (undated) DEVICE — TUBE E-T HI-LO CUFF 7.5MM (10EA/PK)

## (undated) DEVICE — CORDS BIPOLAR COAGULATION - 12FT STERILE DISP. (10EA/BX)

## (undated) DEVICE — DRAPE LARGE 3 QUARTER - (20/CA)

## (undated) DEVICE — KIT ANESTHESIA W/CIRCUIT & 3/LT BAG W/FILTER (20EA/CA)

## (undated) DEVICE — BLADE SURGICAL #15 - (50/BX 3BX/CA)

## (undated) DEVICE — SYRINGE 10 ML CONTROL LL (25EA/BX 4BX/CA)

## (undated) DEVICE — KIT SURGICAL MAZOR X SPINE DISPOSABLE (1EA)

## (undated) DEVICE — DRAPE C ARMOR (12EA/CA)

## (undated) DEVICE — BRONCHOSCOPE 4 SLIM 3.8/1.2 (5EA/CA)

## (undated) DEVICE — KIT  I.V. START (100EA/CA)

## (undated) DEVICE — COVER LIGHT HANDLE ALC PLUS DISP (18EA/BX)

## (undated) DEVICE — SUTURE 2-0 SILK SH C/R ETHICON (12PK/BX)

## (undated) DEVICE — SUTURE 1 VICRYL PLUS CTX - 8 X 18 INCH (12/BX)

## (undated) DEVICE — GVL 3 STAT DISPOSABLE - (10/BX)

## (undated) DEVICE — CANISTER SUCTION 3000ML MECHANICAL FILTER AUTO SHUTOFF MEDI-VAC NONSTERILE LF DISP  (40EA/CA)

## (undated) DEVICE — GLOVE BIOGEL PI INDICATOR SZ 6.0 SURGICAL PF LF -(200PR/CA)

## (undated) DEVICE — SYRINGE ASEPTO - (50EA/CA

## (undated) DEVICE — SYRINGE 30 ML LL (56/BX)

## (undated) DEVICE — PACK MAJOR BASIN - (2EA/CA)

## (undated) DEVICE — MASK OXYGEN VNYL ADLT MED CONC WITH 7 FOOT TUBING  - (50EA/CA)

## (undated) DEVICE — GLOVE BIOGEL PI INDICATOR SZ 8.0 SURGICAL PF LF -(50/BX 4BX/CA)

## (undated) DEVICE — Device

## (undated) DEVICE — GLOVE BIOGEL PI INDICATOR SZ 7.5 SURGICAL PF LF -(50/BX 4BX/CA)

## (undated) DEVICE — SUTURE 4-0 MONOCRYL PLUS PS-2 - 27 INCH (36/BX)

## (undated) DEVICE — STAPLER SKIN DISP - (6/BX 10BX/CA) VISISTAT

## (undated) DEVICE — TRAY CATHETER FOLEY URINE METER W/STATLOCK 350ML (10EA/CA)

## (undated) DEVICE — GOWN WARMING STANDARD FLEX - (30/CA)

## (undated) DEVICE — PATTIES SURG NEURO X-RAY 1X1 (10EA/PK 20PK/CA)

## (undated) DEVICE — DRILL TOOL MR8-31TD3030 MR8 TWST 3MMX30MM (1/EA)

## (undated) DEVICE — KIT SURGIFLO W/OUT THROMBIN - (6EA/CA)

## (undated) DEVICE — BAG SPONGE COUNT 10.25 X 32 - BLUE (250/CA)

## (undated) DEVICE — DRAPE MICROSCOPE X-LONG (10EA/CA)

## (undated) DEVICE — CATHETER IV SAFETY 14 GA X 2 IN (200/CA)

## (undated) DEVICE — MIDAS LUBRICATOR DIFFUSER PACK (4EA/CA)

## (undated) DEVICE — GLOVE BIOGEL SZ 8.5 SURGICAL PF LTX - (50PR/BX 4BX/CA)

## (undated) DEVICE — TOOL MR8 14CM MATCH HD SYM-TRI 3MM DIAMETER (1/EA)

## (undated) DEVICE — GOWN SURGICAL XX-LARGE - (28EA/CA) SIRUS NON REINFORCED

## (undated) DEVICE — DRAPESURG STERI-DRAPE LONG - (10/BX 4BX/CA)

## (undated) DEVICE — SPONGE PEANUT - (5/PK 50PK/CA)

## (undated) DEVICE — PATTIES SURG NEURO X-RAY 1/2X1/2 (10EA/PK 20PK/CS)

## (undated) DEVICE — TRAY SRGPRP PVP IOD WT PRP - (20/CA)

## (undated) DEVICE — GOWN SURGEONS X-LARGE - DISP. (30/CA)

## (undated) DEVICE — MEDICINE CUP STERILE 2 OZ - (100/CA)

## (undated) DEVICE — PROBE KARTUSH BIPOL STIMULATI (5EA/PK)

## (undated) DEVICE — SUTURE 2-0 VICRYL PLUS SH - 27 INCH (36/BX)

## (undated) DEVICE — ELECTRODE EMG PAIRED MEDTRONIC XOMED PRASS 18MM (5EA/PK)

## (undated) DEVICE — GLOVE BIOGEL SZ 6.5 SURGICAL PF LTX (50PR/BX 4BX/CA)

## (undated) DEVICE — DRESSING POST OP BORDER 4INX6IN AG (70/CA)

## (undated) DEVICE — BONE WAX (12PK/BX)

## (undated) DEVICE — COVER MAYO STAND X-LG - (22EA/CA)

## (undated) DEVICE — ELECTRODE 850 FOAM ADHESIVE - HYDROGEL RADIOTRNSPRNT (50/PK)

## (undated) DEVICE — GLOVE BIOGEL PI ORTHO SZ 6 SURGICAL PF LF (40PR/BX)

## (undated) DEVICE — CHLORAPREP 26 ML APPLICATOR - ORANGE TINT(25/CA)

## (undated) DEVICE — LACTATED RINGERS INJ. 500 ML - (24EA/CA)

## (undated) DEVICE — PASSER CATHETER 38 CM